# Patient Record
Sex: MALE | Race: WHITE | URBAN - METROPOLITAN AREA
[De-identification: names, ages, dates, MRNs, and addresses within clinical notes are randomized per-mention and may not be internally consistent; named-entity substitution may affect disease eponyms.]

---

## 2023-06-23 ENCOUNTER — OFFICE VISIT (OUTPATIENT)
Dept: PRIMARY CARE CLINIC | Age: 68
End: 2023-06-23
Payer: MEDICARE

## 2023-06-23 VITALS
HEART RATE: 67 BPM | SYSTOLIC BLOOD PRESSURE: 142 MMHG | BODY MASS INDEX: 34.29 KG/M2 | DIASTOLIC BLOOD PRESSURE: 82 MMHG | HEIGHT: 72 IN | WEIGHT: 253.2 LBS | OXYGEN SATURATION: 99 %

## 2023-06-23 DIAGNOSIS — Z11.59 SCREENING FOR VIRAL DISEASE: ICD-10-CM

## 2023-06-23 DIAGNOSIS — M10.9 GOUT, UNSPECIFIED CAUSE, UNSPECIFIED CHRONICITY, UNSPECIFIED SITE: ICD-10-CM

## 2023-06-23 DIAGNOSIS — G45.9 TIA (TRANSIENT ISCHEMIC ATTACK): Primary | ICD-10-CM

## 2023-06-23 DIAGNOSIS — E78.00 HYPERCHOLESTEROLEMIA: ICD-10-CM

## 2023-06-23 DIAGNOSIS — I10 MALIGNANT HYPERTENSION: ICD-10-CM

## 2023-06-23 DIAGNOSIS — Z12.5 PROSTATE CANCER SCREENING: ICD-10-CM

## 2023-06-23 PROCEDURE — 99204 OFFICE O/P NEW MOD 45 MIN: CPT | Performed by: INTERNAL MEDICINE

## 2023-06-23 PROCEDURE — 1123F ACP DISCUSS/DSCN MKR DOCD: CPT | Performed by: INTERNAL MEDICINE

## 2023-06-23 PROCEDURE — 3077F SYST BP >= 140 MM HG: CPT | Performed by: INTERNAL MEDICINE

## 2023-06-23 PROCEDURE — 3079F DIAST BP 80-89 MM HG: CPT | Performed by: INTERNAL MEDICINE

## 2023-06-23 RX ORDER — ALLOPURINOL 100 MG/1
100 TABLET ORAL DAILY
Qty: 90 TABLET | Refills: 1 | Status: SHIPPED | OUTPATIENT
Start: 2023-06-23

## 2023-06-23 RX ORDER — METOPROLOL TARTRATE 100 MG/1
100 TABLET ORAL 2 TIMES DAILY
Qty: 180 TABLET | Refills: 1 | Status: SHIPPED | OUTPATIENT
Start: 2023-06-23

## 2023-06-23 RX ORDER — CLONIDINE HYDROCHLORIDE 0.3 MG/1
0.3 TABLET ORAL EVERY 8 HOURS SCHEDULED
Qty: 270 TABLET | Refills: 0 | Status: SHIPPED | OUTPATIENT
Start: 2023-06-23

## 2023-06-23 RX ORDER — LISINOPRIL 10 MG/1
10 TABLET ORAL DAILY
Qty: 90 TABLET | Refills: 1 | Status: SHIPPED | OUTPATIENT
Start: 2023-06-23

## 2023-06-23 RX ORDER — PRAVASTATIN SODIUM 40 MG
40 TABLET ORAL EVERY EVENING
Qty: 90 TABLET | Refills: 1 | Status: SHIPPED | OUTPATIENT
Start: 2023-06-23

## 2023-06-23 SDOH — ECONOMIC STABILITY: FOOD INSECURITY: WITHIN THE PAST 12 MONTHS, THE FOOD YOU BOUGHT JUST DIDN'T LAST AND YOU DIDN'T HAVE MONEY TO GET MORE.: NEVER TRUE

## 2023-06-23 SDOH — ECONOMIC STABILITY: INCOME INSECURITY: HOW HARD IS IT FOR YOU TO PAY FOR THE VERY BASICS LIKE FOOD, HOUSING, MEDICAL CARE, AND HEATING?: NOT HARD AT ALL

## 2023-06-23 SDOH — ECONOMIC STABILITY: FOOD INSECURITY: WITHIN THE PAST 12 MONTHS, YOU WORRIED THAT YOUR FOOD WOULD RUN OUT BEFORE YOU GOT MONEY TO BUY MORE.: NEVER TRUE

## 2023-06-23 SDOH — ECONOMIC STABILITY: HOUSING INSECURITY
IN THE LAST 12 MONTHS, WAS THERE A TIME WHEN YOU DID NOT HAVE A STEADY PLACE TO SLEEP OR SLEPT IN A SHELTER (INCLUDING NOW)?: NO

## 2023-06-23 ASSESSMENT — PATIENT HEALTH QUESTIONNAIRE - PHQ9
SUM OF ALL RESPONSES TO PHQ QUESTIONS 1-9: 0
SUM OF ALL RESPONSES TO PHQ QUESTIONS 1-9: 0
SUM OF ALL RESPONSES TO PHQ9 QUESTIONS 1 & 2: 0
2. FEELING DOWN, DEPRESSED OR HOPELESS: 0
SUM OF ALL RESPONSES TO PHQ QUESTIONS 1-9: 0
SUM OF ALL RESPONSES TO PHQ QUESTIONS 1-9: 0
1. LITTLE INTEREST OR PLEASURE IN DOING THINGS: 0

## 2023-06-23 NOTE — PROGRESS NOTES
6/23/2023   Dolores Sullivan  1955    The patients PMH, surgical history, family history, medications, allergies were all reviewed and updated as appropriate today. Current Outpatient Medications on File Prior to Visit   Medication Sig Dispense Refill    ibuprofen (ADVIL;MOTRIN) 800 MG tablet Take 1 tablet by mouth every 8 hours as needed for Pain or Fever 20 tablet 0    lisinopril (PRINIVIL;ZESTRIL) 10 MG tablet Take 1 tablet by mouth daily      allopurinol (ZYLOPRIM) 100 MG tablet Take 1 tablet by mouth daily      cloNIDine (CATAPRES) 0.3 MG tablet Take 1 tablet by mouth every 8 hours. 60 tablet 0    pravastatin (PRAVACHOL) 40 MG tablet Take 1 tablet by mouth every evening. 30 tablet 0    metoprolol (LOPRESSOR) 100 MG tablet Take 1 tablet by mouth 2 times daily. 60 tablet 0    aspirin 81 MG EC tablet Take 1 tablet by mouth daily. 30 tablet 0     No current facility-administered medications on file prior to visit. Chief Complaint   Patient presents with    New Patient     Here to establish care. HPI:  New patient, wants to re-establish care again with this office, used to be pt here 10 years ago    Review of Systems    OBJECTIVE:  BP (!) 142/82 (Site: Right Upper Arm, Position: Sitting, Cuff Size: Large Adult)   Pulse 67   Ht 6' (1.829 m)   Wt 253 lb 3.2 oz (114.9 kg)   SpO2 99%   BMI 34.34 kg/m²    Wt Readings from Last 3 Encounters:   06/23/23 253 lb 3.2 oz (114.9 kg)       Physical Exam  Vitals and nursing note reviewed. Constitutional:       General: He is not in acute distress. Appearance: He is well-developed. Comments: BP (!) 142/82 (Site: Right Upper Arm, Position: Sitting, Cuff Size: Large Adult)   Pulse 67   Ht 6' (1.829 m)   Wt 253 lb 3.2 oz (114.9 kg)   SpO2 99%   BMI 34.34 kg/m²    HENT:      Head: Normocephalic and atraumatic. Eyes:      General: No scleral icterus. Right eye: No discharge. Left eye: No discharge.       Conjunctiva/sclera:

## 2023-07-31 DIAGNOSIS — Z12.5 PROSTATE CANCER SCREENING: ICD-10-CM

## 2023-07-31 DIAGNOSIS — Z11.59 SCREENING FOR VIRAL DISEASE: ICD-10-CM

## 2023-07-31 DIAGNOSIS — G45.9 TIA (TRANSIENT ISCHEMIC ATTACK): ICD-10-CM

## 2023-07-31 DIAGNOSIS — M10.9 GOUT, UNSPECIFIED CAUSE, UNSPECIFIED CHRONICITY, UNSPECIFIED SITE: ICD-10-CM

## 2023-07-31 DIAGNOSIS — I10 MALIGNANT HYPERTENSION: ICD-10-CM

## 2023-07-31 LAB
ALBUMIN SERPL-MCNC: 4.4 G/DL (ref 3.4–5)
ALBUMIN/GLOB SERPL: 2 {RATIO} (ref 1.1–2.2)
ALP SERPL-CCNC: 136 U/L (ref 40–129)
ALT SERPL-CCNC: 16 U/L (ref 10–40)
ANION GAP SERPL CALCULATED.3IONS-SCNC: 13 MMOL/L (ref 3–16)
AST SERPL-CCNC: 16 U/L (ref 15–37)
BASOPHILS # BLD: 0.1 K/UL (ref 0–0.2)
BASOPHILS NFR BLD: 0.8 %
BILIRUB SERPL-MCNC: 0.6 MG/DL (ref 0–1)
BUN SERPL-MCNC: 8 MG/DL (ref 7–20)
CALCIUM SERPL-MCNC: 9 MG/DL (ref 8.3–10.6)
CHLORIDE SERPL-SCNC: 108 MMOL/L (ref 99–110)
CHOLEST SERPL-MCNC: 153 MG/DL (ref 0–199)
CO2 SERPL-SCNC: 24 MMOL/L (ref 21–32)
CREAT SERPL-MCNC: 1.5 MG/DL (ref 0.8–1.3)
DEPRECATED RDW RBC AUTO: 13.3 % (ref 12.4–15.4)
EOSINOPHIL # BLD: 0.5 K/UL (ref 0–0.6)
EOSINOPHIL NFR BLD: 7 %
GFR SERPLBLD CREATININE-BSD FMLA CKD-EPI: 50 ML/MIN/{1.73_M2}
GLUCOSE P FAST SERPL-MCNC: 96 MG/DL (ref 70–99)
HCT VFR BLD AUTO: 46.2 % (ref 40.5–52.5)
HCV AB SERPL QL IA: NORMAL
HDLC SERPL-MCNC: 36 MG/DL (ref 40–60)
HGB BLD-MCNC: 15.5 G/DL (ref 13.5–17.5)
LDL CHOLESTEROL CALCULATED: 92 MG/DL
LYMPHOCYTES # BLD: 2.3 K/UL (ref 1–5.1)
LYMPHOCYTES NFR BLD: 34.4 %
MCH RBC QN AUTO: 31.1 PG (ref 26–34)
MCHC RBC AUTO-ENTMCNC: 33.5 G/DL (ref 31–36)
MCV RBC AUTO: 92.8 FL (ref 80–100)
MONOCYTES # BLD: 0.6 K/UL (ref 0–1.3)
MONOCYTES NFR BLD: 8.6 %
NEUTROPHILS # BLD: 3.3 K/UL (ref 1.7–7.7)
NEUTROPHILS NFR BLD: 49.2 %
PLATELET # BLD AUTO: 236 K/UL (ref 135–450)
PMV BLD AUTO: 10.1 FL (ref 5–10.5)
POTASSIUM SERPL-SCNC: 4.7 MMOL/L (ref 3.5–5.1)
PROT SERPL-MCNC: 6.6 G/DL (ref 6.4–8.2)
PSA SERPL DL<=0.01 NG/ML-MCNC: 1.56 NG/ML (ref 0–4)
RBC # BLD AUTO: 4.98 M/UL (ref 4.2–5.9)
SODIUM SERPL-SCNC: 145 MMOL/L (ref 136–145)
TRIGL SERPL-MCNC: 125 MG/DL (ref 0–150)
URATE SERPL-MCNC: 8 MG/DL (ref 3.5–7.2)
VLDLC SERPL CALC-MCNC: 25 MG/DL
WBC # BLD AUTO: 6.7 K/UL (ref 4–11)

## 2023-08-01 ENCOUNTER — TELEPHONE (OUTPATIENT)
Dept: PRIMARY CARE CLINIC | Age: 68
End: 2023-08-01

## 2023-08-01 LAB
HIV 1+2 AB+HIV1 P24 AG SERPL QL IA: NORMAL
HIV 2 AB SERPL QL IA: NORMAL
HIV1 AB SERPL QL IA: NORMAL
HIV1 P24 AG SERPL QL IA: NORMAL

## 2023-08-01 NOTE — TELEPHONE ENCOUNTER
BP is still high but he can stay on current meds until next visit and we'll discuss BP meds then    Susanne Vidal MD

## 2023-08-01 NOTE — RESULT ENCOUNTER NOTE
Uric acid level remain a little high but is improved from last time  Drink plenty of liquids to avoid dehydration and OK to decrease meat intake a little to try to get uric acid levels back to normal range    Irma Atkins MD

## 2023-08-01 NOTE — TELEPHONE ENCOUNTER
Spoke with patient he states he has had some side effects ringing in the ears, muscle fatigue and diarrhea to the medications Dr Krystyna Arias has prescribed states he stop them went back to taking just Allopurinol and Metoprolol. States BP up and down last bp was 161/90 this morning.  Please advise

## 2023-08-04 ENCOUNTER — OFFICE VISIT (OUTPATIENT)
Dept: PRIMARY CARE CLINIC | Age: 68
End: 2023-08-04

## 2023-08-04 VITALS
SYSTOLIC BLOOD PRESSURE: 164 MMHG | DIASTOLIC BLOOD PRESSURE: 98 MMHG | HEART RATE: 72 BPM | WEIGHT: 252 LBS | HEIGHT: 72 IN | RESPIRATION RATE: 20 BRPM | BODY MASS INDEX: 34.13 KG/M2 | OXYGEN SATURATION: 96 %

## 2023-08-04 DIAGNOSIS — E78.00 HYPERCHOLESTEROLEMIA: ICD-10-CM

## 2023-08-04 DIAGNOSIS — I10 MALIGNANT HYPERTENSION: ICD-10-CM

## 2023-08-04 DIAGNOSIS — G45.9 TIA (TRANSIENT ISCHEMIC ATTACK): Primary | ICD-10-CM

## 2023-08-04 RX ORDER — LISINOPRIL 10 MG/1
20 TABLET ORAL DAILY
Qty: 90 TABLET | Refills: 1
Start: 2023-08-04

## 2023-08-17 ENCOUNTER — OFFICE VISIT (OUTPATIENT)
Dept: PRIMARY CARE CLINIC | Age: 68
End: 2023-08-17

## 2023-08-17 VITALS
BODY MASS INDEX: 33.7 KG/M2 | HEART RATE: 61 BPM | WEIGHT: 248.8 LBS | OXYGEN SATURATION: 98 % | HEIGHT: 72 IN | DIASTOLIC BLOOD PRESSURE: 90 MMHG | SYSTOLIC BLOOD PRESSURE: 140 MMHG

## 2023-08-17 DIAGNOSIS — Z23 NEED FOR PROPHYLACTIC VACCINATION AND INOCULATION AGAINST VARICELLA: ICD-10-CM

## 2023-08-17 DIAGNOSIS — E79.0 HYPERURICEMIA: ICD-10-CM

## 2023-08-17 DIAGNOSIS — Z00.00 INITIAL MEDICARE ANNUAL WELLNESS VISIT: ICD-10-CM

## 2023-08-17 DIAGNOSIS — E78.00 HYPERCHOLESTEROLEMIA: ICD-10-CM

## 2023-08-17 DIAGNOSIS — Z12.11 COLON CANCER SCREENING: ICD-10-CM

## 2023-08-17 DIAGNOSIS — Z23 NEED FOR PROPHYLACTIC VACCINATION AGAINST DIPHTHERIA-TETANUS-PERTUSSIS (DTP): ICD-10-CM

## 2023-08-17 DIAGNOSIS — I10 MALIGNANT HYPERTENSION: Primary | ICD-10-CM

## 2023-08-17 RX ORDER — ALLOPURINOL 100 MG/1
100 TABLET ORAL DAILY
Qty: 90 TABLET | Refills: 1 | Status: SHIPPED | OUTPATIENT
Start: 2023-08-17

## 2023-08-17 ASSESSMENT — PATIENT HEALTH QUESTIONNAIRE - PHQ9
2. FEELING DOWN, DEPRESSED OR HOPELESS: 0
SUM OF ALL RESPONSES TO PHQ QUESTIONS 1-9: 0
SUM OF ALL RESPONSES TO PHQ9 QUESTIONS 1 & 2: 0
SUM OF ALL RESPONSES TO PHQ QUESTIONS 1-9: 0
1. LITTLE INTEREST OR PLEASURE IN DOING THINGS: 0

## 2023-08-17 NOTE — PATIENT INSTRUCTIONS
disclaims any warranty or liability for your use of this information. Personalized Preventive Plan for Hair Brannon - 8/17/2023  Medicare offers a range of preventive health benefits. Some of the tests and screenings are paid in full while other may be subject to a deductible, co-insurance, and/or copay. Some of these benefits include a comprehensive review of your medical history including lifestyle, illnesses that may run in your family, and various assessments and screenings as appropriate. After reviewing your medical record and screening and assessments performed today your provider may have ordered immunizations, labs, imaging, and/or referrals for you. A list of these orders (if applicable) as well as your Preventive Care list are included within your After Visit Summary for your review. Other Preventive Recommendations:    A preventive eye exam performed by an eye specialist is recommended every 1-2 years to screen for glaucoma; cataracts, macular degeneration, and other eye disorders. A preventive dental visit is recommended every 6 months. Try to get at least 150 minutes of exercise per week or 10,000 steps per day on a pedometer . Order or download the FREE \"Exercise & Physical Activity: Your Everyday Guide\" from [employer]. Call 0-468.171.2097 or search [employer] online. You need 1592-5943 mg of calcium and 1200-2211 IU of vitamin D per day. It is possible to meet your calcium requirement with diet alone, but a vitamin D supplement is usually necessary to meet this goal.  When exposed to the sun, use a sunscreen that protects against both UVA and UVB radiation with an SPF of 30 or greater. Reapply every 2 to 3 hours or after sweating, drying off with a towel, or swimming. Always wear a seat belt when traveling in a car. Always wear a helmet when riding a bicycle or motorcycle.

## 2023-12-26 NOTE — TELEPHONE ENCOUNTER
LastVisit 8/17/2023     NextVisit 8/19/2024     Pharmacy:    [id number] [address] [place] [address], [address] [phone]  [address] [place] [address]  Phone: [phone] Fax: [phone]     pharmacy confirmed in [facility]

## 2023-12-27 RX ORDER — METOPROLOL TARTRATE 100 MG/1
100 TABLET ORAL 2 TIMES DAILY
Qty: 180 TABLET | Refills: 1 | Status: SHIPPED | OUTPATIENT
Start: 2023-12-27

## 2023-12-27 RX ORDER — LISINOPRIL 10 MG/1
10 TABLET ORAL DAILY
Qty: 90 TABLET | Refills: 1 | Status: SHIPPED | OUTPATIENT
Start: 2023-12-27

## 2023-12-28 ENCOUNTER — OFFICE VISIT (OUTPATIENT)
Dept: PRIMARY CARE CLINIC | Age: 68
End: 2023-12-28
Payer: MEDICARE

## 2023-12-28 VITALS
SYSTOLIC BLOOD PRESSURE: 144 MMHG | HEIGHT: 72 IN | BODY MASS INDEX: 32.64 KG/M2 | TEMPERATURE: 97.8 F | OXYGEN SATURATION: 96 % | DIASTOLIC BLOOD PRESSURE: 92 MMHG | RESPIRATION RATE: 20 BRPM | WEIGHT: 241 LBS | HEART RATE: 67 BPM

## 2023-12-28 DIAGNOSIS — M70.21 OLECRANON BURSITIS OF RIGHT ELBOW: ICD-10-CM

## 2023-12-28 DIAGNOSIS — M10.9 ACUTE GOUT OF RIGHT HAND, UNSPECIFIED CAUSE: Primary | ICD-10-CM

## 2023-12-28 DIAGNOSIS — I10 ESSENTIAL HYPERTENSION, BENIGN: ICD-10-CM

## 2023-12-28 PROCEDURE — 99214 OFFICE O/P EST MOD 30 MIN: CPT | Performed by: FAMILY MEDICINE

## 2023-12-28 PROCEDURE — 3080F DIAST BP >= 90 MM HG: CPT | Performed by: FAMILY MEDICINE

## 2023-12-28 PROCEDURE — 1123F ACP DISCUSS/DSCN MKR DOCD: CPT | Performed by: FAMILY MEDICINE

## 2023-12-28 PROCEDURE — 3077F SYST BP >= 140 MM HG: CPT | Performed by: FAMILY MEDICINE

## 2023-12-28 RX ORDER — INDOMETHACIN 25 MG/1
50 CAPSULE ORAL 2 TIMES DAILY WITH MEALS
COMMUNITY

## 2023-12-28 RX ORDER — PREDNISONE 20 MG/1
40 TABLET ORAL DAILY
Qty: 10 TABLET | Refills: 0 | Status: SHIPPED | OUTPATIENT
Start: 2023-12-28 | End: 2024-01-02

## 2023-12-28 RX ORDER — COLCHICINE 0.6 MG/1
0.6 TABLET ORAL 2 TIMES DAILY
Qty: 10 TABLET | Refills: 0 | Status: SHIPPED | OUTPATIENT
Start: 2023-12-28 | End: 2024-01-02

## 2024-01-02 RX ORDER — PRAVASTATIN SODIUM 40 MG
40 TABLET ORAL EVERY EVENING
Qty: 90 TABLET | Refills: 1 | Status: SHIPPED | OUTPATIENT
Start: 2024-01-02

## 2024-01-02 NOTE — TELEPHONE ENCOUNTER
LastVisit 8/17/2023     NextVisit 1/5/2024     Pharmacy:    [id number] - [place], [place] - [address] [facility] [address] [facility] - P [phone] - F [phone] [address] [facility] [address]  Phone: [phone] Fax: [phone]     pharmacy confirmed in EPIC

## 2024-01-22 ENCOUNTER — COMMUNITY OUTREACH (OUTPATIENT)
Dept: PRIMARY CARE CLINIC | Age: 69
End: 2024-01-22

## 2024-03-19 RX ORDER — INDOMETHACIN 25 MG/1
50 CAPSULE ORAL 2 TIMES DAILY WITH MEALS
Qty: 60 CAPSULE | Refills: 1 | Status: SHIPPED | OUTPATIENT
Start: 2024-03-19

## 2024-03-19 RX ORDER — LISINOPRIL 10 MG/1
10 TABLET ORAL DAILY
Qty: 90 TABLET | Refills: 1 | Status: SHIPPED | OUTPATIENT
Start: 2024-03-19

## 2024-03-19 NOTE — TELEPHONE ENCOUNTER
Patient requested refill    indomethacin (INDOCIN) 25 MG capsule   lisinopril (PRINIVIL;ZESTRIL) 10 MG tablet   [id number] - [place], [place] - [facility] -  [phone] -

## 2024-03-26 ENCOUNTER — TELEPHONE (OUTPATIENT)
Dept: ADMINISTRATIVE | Age: 69
End: 2024-03-26

## 2024-03-26 NOTE — TELEPHONE ENCOUNTER
Submitted PA for Indomethacin 25MG capsules, via FAX to [facility], Case: [id number]. STATUS: PENDING     Follow up done daily; if no decision with in three days we will refax.  If another three days goes by with no decision will call the insurance for status.

## 2024-04-01 NOTE — TELEPHONE ENCOUNTER
Called [facility] for a status check.  Spoke to Marcell SHORT who said that the PA for Indomethacin was APPROVED through 12/31/2024.    If this requires a response please respond to the pool ( P MHCX PSC MEDICATION PRE-AUTH).      Thank you please advise patient.

## 2024-05-20 RX ORDER — INDOMETHACIN 25 MG/1
50 CAPSULE ORAL 2 TIMES DAILY WITH MEALS
Qty: 60 CAPSULE | Refills: 1 | Status: SHIPPED | OUTPATIENT
Start: 2024-05-20

## 2024-05-20 NOTE — TELEPHONE ENCOUNTER
Recent Visits  Date Type Provider Dept   12/28/23 Office Visit Reyes, Eleia J, MD [facility]   08/17/23 Office Visit Pino Lundberg MD [facility]   08/04/23 Office Visit Pino Lundberg MD [facility]   06/23/23 Office Visit Pino Lundberg MD [facility]   Showing recent visits within past 540 days with a meds authorizing provider and meeting all other requirements  Future Appointments  Date Type Provider Dept   08/19/24 Appointment Pino Lundberg MD [facility]   Showing future appointments within next 150 days with a meds authorizing provider and meeting all other requirements

## 2024-06-13 DIAGNOSIS — I10 ESSENTIAL (PRIMARY) HYPERTENSION: ICD-10-CM

## 2024-06-14 RX ORDER — AMLODIPINE BESYLATE 10 MG/1
10 TABLET ORAL DAILY
Qty: 90 TABLET | Refills: 1 | Status: SHIPPED | OUTPATIENT
Start: 2024-06-14

## 2024-06-14 NOTE — TELEPHONE ENCOUNTER
LastVisit 8/17/2023   LastLabs 08/17/2023   NextVisit 9/4/2024   LastRefilled unknown  Pharmacy:    [id number] [place], [place] - [address] [facility] - P [phone] - F [phone] [address] [facility] [address]  Phone: [phone] Fax: [phone]     pharmacy confirmed in EPIC

## 2024-06-17 RX ORDER — INDOMETHACIN 25 MG/1
50 CAPSULE ORAL 2 TIMES DAILY WITH MEALS
Qty: 60 CAPSULE | Refills: 1 | Status: SHIPPED | OUTPATIENT
Start: 2024-06-17

## 2024-06-17 NOTE — TELEPHONE ENCOUNTER
Recent Visits  Date Type Provider Dept   12/28/23 Office Visit Reyes, Eleia J, MD [facility]   08/17/23 Office Visit Pino Lundberg MD [facility]   08/04/23 Office Visit Pino Lundberg MD [facility]   06/23/23 Office Visit Pino Lundberg MD [facility]   Showing recent visits within past 540 days with a meds authorizing provider and meeting all other requirements  Future Appointments  Date Type Provider Dept   09/04/24 Appointment Pino Lundberg MD [facility]   Showing future appointments within next 150 days with a meds authorizing provider and meeting all other requirements

## 2024-06-20 RX ORDER — METOPROLOL TARTRATE 100 MG/1
100 TABLET ORAL 2 TIMES DAILY
Qty: 180 TABLET | Refills: 1 | Status: SHIPPED | OUTPATIENT
Start: 2024-06-20

## 2024-06-20 NOTE — TELEPHONE ENCOUNTER
Recent Visits  Date Type Provider Dept   12/28/23 Office Visit Reyes, Eleia J, MD [facility]   08/17/23 Office Visit Pino Lundberg MD [facility]   08/04/23 Office Visit Pino Lundberg MD [facility]   06/23/23 Office Visit Pino Lundberg MD [facility]   Showing recent visits within past 540 days with a meds authorizing provider and meeting all other requirements  Future Appointments  Date Type Provider Dept   09/04/24 Appointment Pino Lundberg MD [facility]   Showing future appointments within next 150 days with a meds authorizing provider and meeting all other requirements

## 2024-09-05 RX ORDER — INDOMETHACIN 25 MG/1
50 CAPSULE ORAL 2 TIMES DAILY WITH MEALS
Qty: 60 CAPSULE | Refills: 1 | OUTPATIENT
Start: 2024-09-05

## 2024-09-06 RX ORDER — LISINOPRIL 10 MG/1
10 TABLET ORAL DAILY
Qty: 90 TABLET | Refills: 1 | Status: SHIPPED | OUTPATIENT
Start: 2024-09-06

## 2024-09-06 NOTE — TELEPHONE ENCOUNTER
Recent Visits  Date Type Provider Dept   12/28/23 Office Visit Reyes, Eleia J, MD [facility]   08/17/23 Office Visit Pino Lundberg MD [facility]   08/04/23 Office Visit Pino Lundberg MD [facility]   06/23/23 Office Visit Pino Lundberg MD [facility]   Showing recent visits within past 540 days with a meds authorizing provider and meeting all other requirements  Future Appointments  No visits were found meeting these conditions.  Showing future appointments within next 150 days with a meds authorizing provider and meeting all other requirements

## 2024-09-23 ENCOUNTER — OFFICE VISIT (OUTPATIENT)
Dept: PRIMARY CARE CLINIC | Age: 69
End: 2024-09-23
Payer: MEDICARE

## 2024-09-23 VITALS
HEART RATE: 63 BPM | SYSTOLIC BLOOD PRESSURE: 130 MMHG | DIASTOLIC BLOOD PRESSURE: 80 MMHG | BODY MASS INDEX: 32.14 KG/M2 | WEIGHT: 237 LBS | OXYGEN SATURATION: 97 %

## 2024-09-23 DIAGNOSIS — Z12.5 PROSTATE CANCER SCREENING: Primary | ICD-10-CM

## 2024-09-23 DIAGNOSIS — Z12.11 COLON CANCER SCREENING: ICD-10-CM

## 2024-09-23 DIAGNOSIS — Z23 NEED FOR PNEUMOCOCCAL 20-VALENT CONJUGATE VACCINATION: ICD-10-CM

## 2024-09-23 DIAGNOSIS — I10 MALIGNANT HYPERTENSION: ICD-10-CM

## 2024-09-23 DIAGNOSIS — E78.00 HYPERCHOLESTEROLEMIA: ICD-10-CM

## 2024-09-23 DIAGNOSIS — G45.9 TIA (TRANSIENT ISCHEMIC ATTACK): ICD-10-CM

## 2024-09-23 PROCEDURE — 3079F DIAST BP 80-89 MM HG: CPT | Performed by: INTERNAL MEDICINE

## 2024-09-23 PROCEDURE — 1123F ACP DISCUSS/DSCN MKR DOCD: CPT | Performed by: INTERNAL MEDICINE

## 2024-09-23 PROCEDURE — 3075F SYST BP GE 130 - 139MM HG: CPT | Performed by: INTERNAL MEDICINE

## 2024-09-23 PROCEDURE — 99214 OFFICE O/P EST MOD 30 MIN: CPT | Performed by: INTERNAL MEDICINE

## 2024-09-23 RX ORDER — PRAVASTATIN SODIUM 40 MG
40 TABLET ORAL EVERY EVENING
Qty: 90 TABLET | Refills: 1 | Status: SHIPPED | OUTPATIENT
Start: 2024-09-23

## 2024-09-23 SDOH — ECONOMIC STABILITY: FOOD INSECURITY: WITHIN THE PAST 12 MONTHS, THE FOOD YOU BOUGHT JUST DIDN'T LAST AND YOU DIDN'T HAVE MONEY TO GET MORE.: NEVER TRUE

## 2024-09-23 SDOH — ECONOMIC STABILITY: FOOD INSECURITY: WITHIN THE PAST 12 MONTHS, YOU WORRIED THAT YOUR FOOD WOULD RUN OUT BEFORE YOU GOT MONEY TO BUY MORE.: NEVER TRUE

## 2024-09-23 SDOH — ECONOMIC STABILITY: INCOME INSECURITY: HOW HARD IS IT FOR YOU TO PAY FOR THE VERY BASICS LIKE FOOD, HOUSING, MEDICAL CARE, AND HEATING?: NOT HARD AT ALL

## 2024-09-23 ASSESSMENT — PATIENT HEALTH QUESTIONNAIRE - PHQ9
2. FEELING DOWN, DEPRESSED OR HOPELESS: NOT AT ALL
SUM OF ALL RESPONSES TO PHQ9 QUESTIONS 1 & 2: 0
SUM OF ALL RESPONSES TO PHQ QUESTIONS 1-9: 0
1. LITTLE INTEREST OR PLEASURE IN DOING THINGS: NOT AT ALL
SUM OF ALL RESPONSES TO PHQ QUESTIONS 1-9: 0

## 2024-12-08 DIAGNOSIS — I10 ESSENTIAL (PRIMARY) HYPERTENSION: ICD-10-CM

## 2024-12-09 RX ORDER — AMLODIPINE BESYLATE 10 MG/1
10 TABLET ORAL DAILY
Qty: 90 TABLET | Refills: 1 | Status: SHIPPED | OUTPATIENT
Start: 2024-12-09

## 2024-12-10 RX ORDER — METOPROLOL TARTRATE 100 MG/1
100 TABLET ORAL 2 TIMES DAILY
Qty: 180 TABLET | Refills: 1 | Status: SHIPPED | OUTPATIENT
Start: 2024-12-10

## 2025-01-23 NOTE — TELEPHONE ENCOUNTER
Phleb at bedside to draw cultures.   RT at bedside to assess vent alarms and suction pt.   Prior a

## 2025-02-26 RX ORDER — LISINOPRIL 10 MG/1
10 TABLET ORAL DAILY
Qty: 90 TABLET | Refills: 1 | Status: SHIPPED | OUTPATIENT
Start: 2025-02-26

## 2025-03-16 ENCOUNTER — APPOINTMENT (OUTPATIENT)
Dept: GENERAL RADIOLOGY | Age: 70
End: 2025-03-16
Payer: MEDICARE

## 2025-03-16 ENCOUNTER — HOSPITAL ENCOUNTER (INPATIENT)
Age: 70
LOS: 4 days | Discharge: HOME | End: 2025-03-21
Attending: STUDENT IN AN ORGANIZED HEALTH CARE EDUCATION/TRAINING PROGRAM | Admitting: INTERNAL MEDICINE
Payer: MEDICARE

## 2025-03-16 DIAGNOSIS — R06.00 DYSPNEA, UNSPECIFIED TYPE: ICD-10-CM

## 2025-03-16 DIAGNOSIS — R65.20 SEVERE SEPSIS: ICD-10-CM

## 2025-03-16 DIAGNOSIS — A41.9 SEVERE SEPSIS: ICD-10-CM

## 2025-03-16 DIAGNOSIS — J18.9 PNEUMONIA DUE TO INFECTIOUS ORGANISM, UNSPECIFIED LATERALITY, UNSPECIFIED PART OF LUNG: Primary | ICD-10-CM

## 2025-03-16 LAB
ALBUMIN SERPL-MCNC: 3.8 G/DL (ref 3.4–5)
ALBUMIN/GLOB SERPL: 1.1 {RATIO} (ref 1.1–2.2)
ALP SERPL-CCNC: 128 U/L (ref 40–129)
ALT SERPL-CCNC: 38 U/L (ref 10–40)
ANION GAP SERPL CALCULATED.3IONS-SCNC: 16 MMOL/L (ref 3–16)
AST SERPL-CCNC: 33 U/L (ref 15–37)
BASOPHILS # BLD: 0 K/UL (ref 0–0.2)
BASOPHILS NFR BLD: 0.1 %
BILIRUB SERPL-MCNC: 1.2 MG/DL (ref 0–1)
BUN SERPL-MCNC: 27 MG/DL (ref 7–20)
CALCIUM SERPL-MCNC: 9.4 MG/DL (ref 8.3–10.6)
CHLORIDE SERPL-SCNC: 99 MMOL/L (ref 99–110)
CO2 SERPL-SCNC: 21 MMOL/L (ref 21–32)
CREAT SERPL-MCNC: 1.5 MG/DL (ref 0.8–1.3)
DEPRECATED RDW RBC AUTO: 13.1 % (ref 12.4–15.4)
EOSINOPHIL # BLD: 0 K/UL (ref 0–0.6)
EOSINOPHIL NFR BLD: 0.1 %
GFR SERPLBLD CREATININE-BSD FMLA CKD-EPI: 49 ML/MIN/{1.73_M2}
GLUCOSE SERPL-MCNC: 154 MG/DL (ref 70–99)
HCT VFR BLD AUTO: 44.3 % (ref 40.5–52.5)
HGB BLD-MCNC: 15.2 G/DL (ref 13.5–17.5)
LYMPHOCYTES # BLD: 1.9 K/UL (ref 1–5.1)
LYMPHOCYTES NFR BLD: 8.6 %
MAGNESIUM SERPL-MCNC: 1.79 MG/DL (ref 1.8–2.4)
MCH RBC QN AUTO: 31.2 PG (ref 26–34)
MCHC RBC AUTO-ENTMCNC: 34.2 G/DL (ref 31–36)
MCV RBC AUTO: 91.1 FL (ref 80–100)
MONOCYTES # BLD: 1.7 K/UL (ref 0–1.3)
MONOCYTES NFR BLD: 7.5 %
NEUTROPHILS # BLD: 18.6 K/UL (ref 1.7–7.7)
NEUTROPHILS NFR BLD: 83.7 %
PLATELET # BLD AUTO: 279 K/UL (ref 135–450)
PMV BLD AUTO: 8.9 FL (ref 5–10.5)
POTASSIUM SERPL-SCNC: 3.4 MMOL/L (ref 3.5–5.1)
PROT SERPL-MCNC: 7.3 G/DL (ref 6.4–8.2)
RBC # BLD AUTO: 4.87 M/UL (ref 4.2–5.9)
SODIUM SERPL-SCNC: 136 MMOL/L (ref 136–145)
TROPONIN, HIGH SENSITIVITY: 33 NG/L (ref 0–22)
WBC # BLD AUTO: 22.2 K/UL (ref 4–11)

## 2025-03-16 PROCEDURE — 82803 BLOOD GASES ANY COMBINATION: CPT

## 2025-03-16 PROCEDURE — 71045 X-RAY EXAM CHEST 1 VIEW: CPT

## 2025-03-16 PROCEDURE — 84484 ASSAY OF TROPONIN QUANT: CPT

## 2025-03-16 PROCEDURE — 85379 FIBRIN DEGRADATION QUANT: CPT

## 2025-03-16 PROCEDURE — 99285 EMERGENCY DEPT VISIT HI MDM: CPT

## 2025-03-16 PROCEDURE — 87636 SARSCOV2 & INF A&B AMP PRB: CPT

## 2025-03-16 PROCEDURE — 85025 COMPLETE CBC W/AUTO DIFF WBC: CPT

## 2025-03-16 PROCEDURE — 36415 COLL VENOUS BLD VENIPUNCTURE: CPT

## 2025-03-16 PROCEDURE — 83735 ASSAY OF MAGNESIUM: CPT

## 2025-03-16 PROCEDURE — 83880 ASSAY OF NATRIURETIC PEPTIDE: CPT

## 2025-03-16 PROCEDURE — 83605 ASSAY OF LACTIC ACID: CPT

## 2025-03-16 PROCEDURE — 80053 COMPREHEN METABOLIC PANEL: CPT

## 2025-03-16 PROCEDURE — 93005 ELECTROCARDIOGRAM TRACING: CPT | Performed by: STUDENT IN AN ORGANIZED HEALTH CARE EDUCATION/TRAINING PROGRAM

## 2025-03-16 RX ORDER — AZITHROMYCIN MONOHYDRATE 500 MG/5ML
500 INJECTION, POWDER, LYOPHILIZED, FOR SOLUTION INTRAVENOUS ONCE
Status: DISCONTINUED | OUTPATIENT
Start: 2025-03-17 | End: 2025-03-17

## 2025-03-16 RX ORDER — ALBUTEROL SULFATE 0.83 MG/ML
5 SOLUTION RESPIRATORY (INHALATION) ONCE
Status: COMPLETED | OUTPATIENT
Start: 2025-03-17 | End: 2025-03-17

## 2025-03-16 ASSESSMENT — PAIN - FUNCTIONAL ASSESSMENT: PAIN_FUNCTIONAL_ASSESSMENT: 0-10

## 2025-03-16 ASSESSMENT — LIFESTYLE VARIABLES
HOW MANY STANDARD DRINKS CONTAINING ALCOHOL DO YOU HAVE ON A TYPICAL DAY: PATIENT DOES NOT DRINK
HOW OFTEN DO YOU HAVE A DRINK CONTAINING ALCOHOL: NEVER

## 2025-03-16 ASSESSMENT — PAIN DESCRIPTION - LOCATION: LOCATION: BACK

## 2025-03-16 ASSESSMENT — PAIN DESCRIPTION - PAIN TYPE: TYPE: ACUTE PAIN

## 2025-03-16 ASSESSMENT — PAIN SCALES - GENERAL: PAINLEVEL_OUTOF10: 9

## 2025-03-17 ENCOUNTER — APPOINTMENT (OUTPATIENT)
Dept: CT IMAGING | Age: 70
End: 2025-03-17
Payer: MEDICARE

## 2025-03-17 PROBLEM — J15.9 COMMUNITY ACQUIRED BACTERIAL PNEUMONIA: Status: ACTIVE | Noted: 2025-03-17

## 2025-03-17 LAB
ANION GAP SERPL CALCULATED.3IONS-SCNC: 16 MMOL/L (ref 3–16)
BACTERIA URNS QL MICRO: NORMAL /HPF
BASE EXCESS BLDV CALC-SCNC: -2.9 MMOL/L (ref -3–3)
BILIRUB UR QL STRIP.AUTO: NEGATIVE
BUN SERPL-MCNC: 24 MG/DL (ref 7–20)
CALCIUM SERPL-MCNC: 8.6 MG/DL (ref 8.3–10.6)
CHLORIDE SERPL-SCNC: 100 MMOL/L (ref 99–110)
CLARITY UR: CLEAR
CO2 BLDV-SCNC: 54 MMOL/L
CO2 SERPL-SCNC: 18 MMOL/L (ref 21–32)
COHGB MFR BLDV: 2.4 % (ref 0–1.5)
COLOR UR: YELLOW
CREAT SERPL-MCNC: 1.5 MG/DL (ref 0.8–1.3)
D-DIMER QUANTITATIVE: 1.05 UG/ML FEU (ref 0–0.6)
DEPRECATED RDW RBC AUTO: 13.1 % (ref 12.4–15.4)
DO-HGB MFR BLDV: 19 %
EKG ATRIAL RATE: 108 BPM
EKG DIAGNOSIS: NORMAL
EKG P AXIS: 93 DEGREES
EKG P-R INTERVAL: 144 MS
EKG Q-T INTERVAL: 324 MS
EKG QRS DURATION: 74 MS
EKG QTC CALCULATION (BAZETT): 434 MS
EKG R AXIS: 28 DEGREES
EKG T AXIS: 100 DEGREES
EKG VENTRICULAR RATE: 108 BPM
EPI CELLS #/AREA URNS AUTO: 2 /HPF (ref 0–5)
FLUAV RNA RESP QL NAA+PROBE: DETECTED
FLUBV RNA RESP QL NAA+PROBE: NOT DETECTED
GFR SERPLBLD CREATININE-BSD FMLA CKD-EPI: 49 ML/MIN/{1.73_M2}
GLUCOSE BLD-MCNC: 123 MG/DL (ref 70–99)
GLUCOSE BLD-MCNC: 126 MG/DL (ref 70–99)
GLUCOSE BLD-MCNC: 127 MG/DL (ref 70–99)
GLUCOSE SERPL-MCNC: 229 MG/DL (ref 70–99)
GLUCOSE UR STRIP.AUTO-MCNC: NEGATIVE MG/DL
HCO3 BLDV-SCNC: 22.6 MMOL/L (ref 23–29)
HCT VFR BLD AUTO: 39.7 % (ref 40.5–52.5)
HGB BLD-MCNC: 13.6 G/DL (ref 13.5–17.5)
HGB UR QL STRIP.AUTO: ABNORMAL
HYALINE CASTS #/AREA URNS AUTO: 2 /LPF (ref 0–8)
KETONES UR STRIP.AUTO-MCNC: NEGATIVE MG/DL
LACTATE BLDV-SCNC: 1.4 MMOL/L (ref 0.4–1.9)
LACTATE BLDV-SCNC: 1.7 MMOL/L (ref 0.4–1.9)
LACTATE BLDV-SCNC: 2.4 MMOL/L (ref 0.4–1.9)
LACTATE BLDV-SCNC: 2.4 MMOL/L (ref 0.4–2)
LACTATE BLDV-SCNC: 4.8 MMOL/L (ref 0.4–1.9)
LEUKOCYTE ESTERASE UR QL STRIP.AUTO: NEGATIVE
MAGNESIUM SERPL-MCNC: 1.82 MG/DL (ref 1.8–2.4)
MCH RBC QN AUTO: 31.1 PG (ref 26–34)
MCHC RBC AUTO-ENTMCNC: 34.3 G/DL (ref 31–36)
MCV RBC AUTO: 90.7 FL (ref 80–100)
METHGB MFR BLDV: 0.3 %
NITRITE UR QL STRIP.AUTO: NEGATIVE
NT-PROBNP SERPL-MCNC: 584 PG/ML (ref 0–124)
O2 CT VFR BLDV CALC: 17 VOL %
O2 THERAPY: ABNORMAL
ORGANISM: ABNORMAL
PCO2 BLDV: 41.3 MMHG (ref 40–50)
PERFORMED ON: ABNORMAL
PH BLDV: 7.35 [PH] (ref 7.35–7.45)
PH UR STRIP.AUTO: 5 [PH] (ref 5–8)
PHOSPHATE SERPL-MCNC: 2.1 MG/DL (ref 2.5–4.9)
PLATELET # BLD AUTO: 259 K/UL (ref 135–450)
PMV BLD AUTO: 9.2 FL (ref 5–10.5)
PO2 BLDV: 46.5 MMHG (ref 25–40)
POTASSIUM SERPL-SCNC: 3.5 MMOL/L (ref 3.5–5.1)
PROT UR STRIP.AUTO-MCNC: 100 MG/DL
RBC # BLD AUTO: 4.37 M/UL (ref 4.2–5.9)
RBC CLUMPS #/AREA URNS AUTO: 1 /HPF (ref 0–4)
REPORT: NORMAL
RESP PATH DNA+RNA PNL NPH NAA+NON-PROBE: ABNORMAL
SAO2 % BLDV: 80 %
SARS-COV-2 RNA RESP QL NAA+PROBE: NOT DETECTED
SODIUM SERPL-SCNC: 134 MMOL/L (ref 136–145)
SP GR UR STRIP.AUTO: >=1.03 (ref 1–1.03)
TROPONIN, HIGH SENSITIVITY: 32 NG/L (ref 0–22)
UA DIPSTICK W REFLEX MICRO PNL UR: YES
URN SPEC COLLECT METH UR: ABNORMAL
UROBILINOGEN UR STRIP-ACNC: 1 E.U./DL
WBC # BLD AUTO: 21.1 K/UL (ref 4–11)
WBC #/AREA URNS AUTO: 2 /HPF (ref 0–5)

## 2025-03-17 PROCEDURE — 87641 MR-STAPH DNA AMP PROBE: CPT

## 2025-03-17 PROCEDURE — 0202U NFCT DS 22 TRGT SARS-COV-2: CPT

## 2025-03-17 PROCEDURE — 1200000000 HC SEMI PRIVATE

## 2025-03-17 PROCEDURE — 6360000002 HC RX W HCPCS: Performed by: STUDENT IN AN ORGANIZED HEALTH CARE EDUCATION/TRAINING PROGRAM

## 2025-03-17 PROCEDURE — 80048 BASIC METABOLIC PNL TOTAL CA: CPT

## 2025-03-17 PROCEDURE — 87040 BLOOD CULTURE FOR BACTERIA: CPT

## 2025-03-17 PROCEDURE — 6370000000 HC RX 637 (ALT 250 FOR IP): Performed by: STUDENT IN AN ORGANIZED HEALTH CARE EDUCATION/TRAINING PROGRAM

## 2025-03-17 PROCEDURE — 6360000002 HC RX W HCPCS: Performed by: NURSE PRACTITIONER

## 2025-03-17 PROCEDURE — 96374 THER/PROPH/DIAG INJ IV PUSH: CPT

## 2025-03-17 PROCEDURE — 84484 ASSAY OF TROPONIN QUANT: CPT

## 2025-03-17 PROCEDURE — 2700000000 HC OXYGEN THERAPY PER DAY

## 2025-03-17 PROCEDURE — 94761 N-INVAS EAR/PLS OXIMETRY MLT: CPT

## 2025-03-17 PROCEDURE — 2500000003 HC RX 250 WO HCPCS: Performed by: STUDENT IN AN ORGANIZED HEALTH CARE EDUCATION/TRAINING PROGRAM

## 2025-03-17 PROCEDURE — 93010 ELECTROCARDIOGRAM REPORT: CPT | Performed by: INTERNAL MEDICINE

## 2025-03-17 PROCEDURE — 81001 URINALYSIS AUTO W/SCOPE: CPT

## 2025-03-17 PROCEDURE — 83735 ASSAY OF MAGNESIUM: CPT

## 2025-03-17 PROCEDURE — 83605 ASSAY OF LACTIC ACID: CPT

## 2025-03-17 PROCEDURE — 2580000003 HC RX 258: Performed by: STUDENT IN AN ORGANIZED HEALTH CARE EDUCATION/TRAINING PROGRAM

## 2025-03-17 PROCEDURE — 71260 CT THORAX DX C+: CPT

## 2025-03-17 PROCEDURE — 84100 ASSAY OF PHOSPHORUS: CPT

## 2025-03-17 PROCEDURE — 96375 TX/PRO/DX INJ NEW DRUG ADDON: CPT

## 2025-03-17 PROCEDURE — 36415 COLL VENOUS BLD VENIPUNCTURE: CPT

## 2025-03-17 PROCEDURE — 94640 AIRWAY INHALATION TREATMENT: CPT

## 2025-03-17 PROCEDURE — 87086 URINE CULTURE/COLONY COUNT: CPT

## 2025-03-17 PROCEDURE — 6360000004 HC RX CONTRAST MEDICATION: Performed by: NURSE PRACTITIONER

## 2025-03-17 PROCEDURE — 2580000003 HC RX 258: Performed by: NURSE PRACTITIONER

## 2025-03-17 PROCEDURE — 85027 COMPLETE CBC AUTOMATED: CPT

## 2025-03-17 RX ORDER — GLUCAGON 1 MG/ML
1 KIT INJECTION PRN
Status: DISCONTINUED | OUTPATIENT
Start: 2025-03-17 | End: 2025-03-21 | Stop reason: HOSPADM

## 2025-03-17 RX ORDER — METOPROLOL TARTRATE 50 MG
100 TABLET ORAL 2 TIMES DAILY
Status: DISCONTINUED | OUTPATIENT
Start: 2025-03-17 | End: 2025-03-21 | Stop reason: HOSPADM

## 2025-03-17 RX ORDER — DEXTROSE MONOHYDRATE 100 MG/ML
INJECTION, SOLUTION INTRAVENOUS CONTINUOUS PRN
Status: DISCONTINUED | OUTPATIENT
Start: 2025-03-17 | End: 2025-03-21 | Stop reason: HOSPADM

## 2025-03-17 RX ORDER — SODIUM CHLORIDE 0.9 % (FLUSH) 0.9 %
5-40 SYRINGE (ML) INJECTION EVERY 12 HOURS SCHEDULED
Status: DISCONTINUED | OUTPATIENT
Start: 2025-03-17 | End: 2025-03-21 | Stop reason: HOSPADM

## 2025-03-17 RX ORDER — MAGNESIUM SULFATE IN WATER 40 MG/ML
2000 INJECTION, SOLUTION INTRAVENOUS PRN
Status: DISCONTINUED | OUTPATIENT
Start: 2025-03-17 | End: 2025-03-21 | Stop reason: HOSPADM

## 2025-03-17 RX ORDER — SODIUM CHLORIDE 0.9 % (FLUSH) 0.9 %
5-40 SYRINGE (ML) INJECTION PRN
Status: DISCONTINUED | OUTPATIENT
Start: 2025-03-17 | End: 2025-03-21 | Stop reason: HOSPADM

## 2025-03-17 RX ORDER — INSULIN LISPRO 100 [IU]/ML
0-8 INJECTION, SOLUTION INTRAVENOUS; SUBCUTANEOUS
Status: DISCONTINUED | OUTPATIENT
Start: 2025-03-17 | End: 2025-03-21 | Stop reason: HOSPADM

## 2025-03-17 RX ORDER — OSELTAMIVIR PHOSPHATE 30 MG/1
30 CAPSULE ORAL 2 TIMES DAILY
Status: DISCONTINUED | OUTPATIENT
Start: 2025-03-17 | End: 2025-03-18

## 2025-03-17 RX ORDER — ALLOPURINOL 100 MG/1
100 TABLET ORAL DAILY
Status: DISCONTINUED | OUTPATIENT
Start: 2025-03-17 | End: 2025-03-21 | Stop reason: HOSPADM

## 2025-03-17 RX ORDER — IOPAMIDOL 755 MG/ML
75 INJECTION, SOLUTION INTRAVASCULAR
Status: COMPLETED | OUTPATIENT
Start: 2025-03-17 | End: 2025-03-17

## 2025-03-17 RX ORDER — ALBUTEROL SULFATE 0.83 MG/ML
2.5 SOLUTION RESPIRATORY (INHALATION) EVERY 4 HOURS PRN
Status: DISCONTINUED | OUTPATIENT
Start: 2025-03-17 | End: 2025-03-21 | Stop reason: HOSPADM

## 2025-03-17 RX ORDER — SODIUM CHLORIDE 9 MG/ML
INJECTION, SOLUTION INTRAVENOUS PRN
Status: DISCONTINUED | OUTPATIENT
Start: 2025-03-17 | End: 2025-03-21 | Stop reason: HOSPADM

## 2025-03-17 RX ORDER — ACETAMINOPHEN 650 MG/1
650 SUPPOSITORY RECTAL EVERY 6 HOURS PRN
Status: DISCONTINUED | OUTPATIENT
Start: 2025-03-17 | End: 2025-03-21 | Stop reason: HOSPADM

## 2025-03-17 RX ORDER — ONDANSETRON 2 MG/ML
4 INJECTION INTRAMUSCULAR; INTRAVENOUS EVERY 6 HOURS PRN
Status: DISCONTINUED | OUTPATIENT
Start: 2025-03-17 | End: 2025-03-21 | Stop reason: HOSPADM

## 2025-03-17 RX ORDER — ONDANSETRON 4 MG/1
4 TABLET, ORALLY DISINTEGRATING ORAL EVERY 8 HOURS PRN
Status: DISCONTINUED | OUTPATIENT
Start: 2025-03-17 | End: 2025-03-21 | Stop reason: HOSPADM

## 2025-03-17 RX ORDER — PRAVASTATIN SODIUM 20 MG
40 TABLET ORAL EVERY EVENING
Status: DISCONTINUED | OUTPATIENT
Start: 2025-03-17 | End: 2025-03-21 | Stop reason: HOSPADM

## 2025-03-17 RX ORDER — IOPAMIDOL 755 MG/ML
75 INJECTION, SOLUTION INTRAVASCULAR
Status: DISCONTINUED | OUTPATIENT
Start: 2025-03-17 | End: 2025-03-17 | Stop reason: HOSPADM

## 2025-03-17 RX ORDER — POTASSIUM CHLORIDE 7.45 MG/ML
10 INJECTION INTRAVENOUS PRN
Status: DISCONTINUED | OUTPATIENT
Start: 2025-03-17 | End: 2025-03-21 | Stop reason: HOSPADM

## 2025-03-17 RX ORDER — IPRATROPIUM BROMIDE AND ALBUTEROL SULFATE 2.5; .5 MG/3ML; MG/3ML
1 SOLUTION RESPIRATORY (INHALATION)
Status: DISCONTINUED | OUTPATIENT
Start: 2025-03-17 | End: 2025-03-19

## 2025-03-17 RX ORDER — LISINOPRIL 10 MG/1
10 TABLET ORAL DAILY
Status: DISCONTINUED | OUTPATIENT
Start: 2025-03-17 | End: 2025-03-21 | Stop reason: HOSPADM

## 2025-03-17 RX ORDER — OSELTAMIVIR PHOSPHATE 75 MG/1
75 CAPSULE ORAL ONCE
Status: COMPLETED | OUTPATIENT
Start: 2025-03-17 | End: 2025-03-17

## 2025-03-17 RX ORDER — ENOXAPARIN SODIUM 100 MG/ML
40 INJECTION SUBCUTANEOUS DAILY
Status: DISCONTINUED | OUTPATIENT
Start: 2025-03-17 | End: 2025-03-21 | Stop reason: HOSPADM

## 2025-03-17 RX ORDER — POLYETHYLENE GLYCOL 3350 17 G/17G
17 POWDER, FOR SOLUTION ORAL DAILY PRN
Status: DISCONTINUED | OUTPATIENT
Start: 2025-03-17 | End: 2025-03-21 | Stop reason: HOSPADM

## 2025-03-17 RX ORDER — POTASSIUM CHLORIDE 1500 MG/1
40 TABLET, EXTENDED RELEASE ORAL PRN
Status: DISCONTINUED | OUTPATIENT
Start: 2025-03-17 | End: 2025-03-21 | Stop reason: HOSPADM

## 2025-03-17 RX ORDER — ACETAMINOPHEN 325 MG/1
650 TABLET ORAL EVERY 6 HOURS PRN
Status: DISCONTINUED | OUTPATIENT
Start: 2025-03-17 | End: 2025-03-21 | Stop reason: HOSPADM

## 2025-03-17 RX ORDER — 0.9 % SODIUM CHLORIDE 0.9 %
1000 INTRAVENOUS SOLUTION INTRAVENOUS ONCE
Status: COMPLETED | OUTPATIENT
Start: 2025-03-17 | End: 2025-03-17

## 2025-03-17 RX ORDER — AMLODIPINE BESYLATE 5 MG/1
10 TABLET ORAL DAILY
Status: DISCONTINUED | OUTPATIENT
Start: 2025-03-17 | End: 2025-03-21 | Stop reason: HOSPADM

## 2025-03-17 RX ORDER — SODIUM CHLORIDE, SODIUM LACTATE, POTASSIUM CHLORIDE, CALCIUM CHLORIDE 600; 310; 30; 20 MG/100ML; MG/100ML; MG/100ML; MG/100ML
INJECTION, SOLUTION INTRAVENOUS CONTINUOUS
Status: DISCONTINUED | OUTPATIENT
Start: 2025-03-17 | End: 2025-03-18

## 2025-03-17 RX ADMIN — OSELTAMIVIR PHOSPHATE 30 MG: 30 CAPSULE ORAL at 20:54

## 2025-03-17 RX ADMIN — SODIUM CHLORIDE 1000 ML: 0.9 INJECTION, SOLUTION INTRAVENOUS at 00:35

## 2025-03-17 RX ADMIN — METOPROLOL TARTRATE 100 MG: 50 TABLET, FILM COATED ORAL at 08:45

## 2025-03-17 RX ADMIN — ENOXAPARIN SODIUM 40 MG: 100 INJECTION SUBCUTANEOUS at 08:45

## 2025-03-17 RX ADMIN — ALBUTEROL SULFATE 5 MG: 2.5 SOLUTION RESPIRATORY (INHALATION) at 00:57

## 2025-03-17 RX ADMIN — METOPROLOL TARTRATE 100 MG: 50 TABLET, FILM COATED ORAL at 20:54

## 2025-03-17 RX ADMIN — WATER 1000 MG: 1 INJECTION INTRAMUSCULAR; INTRAVENOUS; SUBCUTANEOUS at 00:36

## 2025-03-17 RX ADMIN — PRAVASTATIN SODIUM 40 MG: 40 TABLET ORAL at 17:20

## 2025-03-17 RX ADMIN — Medication 10 ML: at 20:58

## 2025-03-17 RX ADMIN — SODIUM CHLORIDE, POTASSIUM CHLORIDE, SODIUM LACTATE AND CALCIUM CHLORIDE: 600; 310; 30; 20 INJECTION, SOLUTION INTRAVENOUS at 02:36

## 2025-03-17 RX ADMIN — IPRATROPIUM BROMIDE AND ALBUTEROL SULFATE 1 DOSE: .5; 3 SOLUTION RESPIRATORY (INHALATION) at 22:16

## 2025-03-17 RX ADMIN — ALLOPURINOL 100 MG: 100 TABLET ORAL at 08:45

## 2025-03-17 RX ADMIN — VANCOMYCIN HYDROCHLORIDE 2000 MG: 10 INJECTION, POWDER, LYOPHILIZED, FOR SOLUTION INTRAVENOUS at 03:06

## 2025-03-17 RX ADMIN — IPRATROPIUM BROMIDE AND ALBUTEROL SULFATE 1 DOSE: .5; 3 SOLUTION RESPIRATORY (INHALATION) at 07:46

## 2025-03-17 RX ADMIN — PRAVASTATIN SODIUM 40 MG: 40 TABLET ORAL at 02:51

## 2025-03-17 RX ADMIN — IOPAMIDOL 75 ML: 755 INJECTION, SOLUTION INTRAVENOUS at 00:16

## 2025-03-17 RX ADMIN — AZITHROMYCIN MONOHYDRATE 500 MG: 500 INJECTION, POWDER, LYOPHILIZED, FOR SOLUTION INTRAVENOUS at 00:40

## 2025-03-17 RX ADMIN — METOPROLOL TARTRATE 100 MG: 50 TABLET, FILM COATED ORAL at 02:51

## 2025-03-17 RX ADMIN — OSELTAMIVIR 75 MG: 75 CAPSULE ORAL at 02:51

## 2025-03-17 RX ADMIN — LISINOPRIL 10 MG: 10 TABLET ORAL at 08:45

## 2025-03-17 RX ADMIN — AMLODIPINE BESYLATE 10 MG: 5 TABLET ORAL at 08:45

## 2025-03-17 RX ADMIN — Medication 10 ML: at 08:52

## 2025-03-17 RX ADMIN — IPRATROPIUM BROMIDE AND ALBUTEROL SULFATE 1 DOSE: .5; 3 SOLUTION RESPIRATORY (INHALATION) at 12:43

## 2025-03-17 RX ADMIN — SODIUM CHLORIDE, POTASSIUM CHLORIDE, SODIUM LACTATE AND CALCIUM CHLORIDE: 600; 310; 30; 20 INJECTION, SOLUTION INTRAVENOUS at 13:46

## 2025-03-17 RX ADMIN — CEFEPIME 2000 MG: 2 INJECTION, POWDER, FOR SOLUTION INTRAVENOUS at 02:37

## 2025-03-17 RX ADMIN — ACETAMINOPHEN 650 MG: 325 TABLET ORAL at 08:48

## 2025-03-17 ASSESSMENT — ENCOUNTER SYMPTOMS
SHORTNESS OF BREATH: 1
ABDOMINAL PAIN: 0
DIARRHEA: 0
VOMITING: 0
COUGH: 1
CHEST TIGHTNESS: 1
NAUSEA: 0

## 2025-03-17 ASSESSMENT — PAIN DESCRIPTION - LOCATION: LOCATION: HEAD

## 2025-03-17 ASSESSMENT — PAIN SCALES - GENERAL: PAINLEVEL_OUTOF10: 5

## 2025-03-17 NOTE — RT PROTOCOL NOTE
RT Nebulizer Bronchodilator Protocol Note    There is a bronchodilator order in the chart from a provider indicating to follow the RT Bronchodilator Protocol and there is an “Initiate RT Bronchodilator Protocol” order as well (see protocol at bottom of note).    CXR Findings:  XR CHEST PORTABLE  Result Date: 3/16/2025  Right basilar airspace opacity, atelectasis versus pneumonia.       The findings from the last RT Protocol Assessment were as follows:  Smoking: Chronic pulmonary disease  Respiratory Pattern: Dyspnea on exertion or RR 21-25 bpm  Breath Sounds: Slightly diminished and/or crackles  Cough: Strong, productive  Indication for Bronchodilator Therapy: On home bronchodilators  Bronchodilator Assessment Score: 7    Aerosolized bronchodilator medication orders have been revised according to the RT Nebulizer Bronchodilator Protocol below.    Respiratory Therapist to perform RT Therapy Protocol Assessment initially then follow the protocol.  Repeat RT Therapy Protocol Assessment PRN for score 0-3 or on second treatment, BID, and PRN for scores above 3.    No Indications - adjust the frequency to every 6 hours PRN wheezing or bronchospasm, if no treatments needed after 48 hours then discontinue using Per Protocol order mode.     If indication present, adjust the RT bronchodilator orders based on the Bronchodilator Assessment Score as indicated below.  If a patient is on this medication at home then do not decrease Frequency below that used at home.    0-3 - enter or revise RT bronchodilator order(s) to equivalent RT Bronchodilator order with Frequency of every 4 hours PRN for wheezing or increased work of breathing using Per Protocol order mode.       4-6 - enter or revise RT Bronchodilator order(s) to two equivalent RT bronchodilator orders with one order with BID Frequency and one order with Frequency of every 4 hours PRN wheezing or increased work of breathing using Per Protocol order mode.         7-10 -

## 2025-03-17 NOTE — ED PROVIDER NOTES
[facility] ICU  EMERGENCY DEPARTMENT ENCOUNTER        Pt Name: Duy Gant  MRN: [id number]  Birthdate 1955  Date of evaluation: 3/16/2025  Provider: NANCY Garibay - MARIA DE JESUS  PCP: Pino Lundberg MD  Note Started: 5:07 AM EDT 3/17/25       I have seen and evaluated this patient with my supervising physician tiago      CHIEF COMPLAINT       Chief Complaint   Patient presents with    Shortness of Breath     Patient arrived by [facility] with SOB x 1 week. Worse in the last 1-2 days. Two Duoneb given in route and 60 mg Solumedrol in route by EMS. Initial oxygen 83% in route. Arrived on NRB.        HISTORY OF PRESENT ILLNESS: 1 or more Elements     History From: patient and EMS  Limitations to history : None    Duy Gant is a 70 y.o. male who presents to the emergency department with complaint of shortness of breath and productive cough over the past 1 week, worse over the past 1 or 2 days.  EMS transported the patient to give him 60 mg of Solu-Medrol and 2 DuoNeb treatments.  Reports that he was hypoxic initially at 83% on room air, no history of supplemental oxygen.    Patient is ill-appearing, conversationally dyspneic.  Productive cough present.    Denies any lightheadedness, dizziness, visual disturbances.  No chest pain or pressure.  No neck or back pain.  No abdominal pain, nausea, vomiting, diarrhea, constipation, or dysuria.  No rash.    Nursing Notes were all reviewed and agreed with or any disagreements were addressed in the HPI.    REVIEW OF SYSTEMS :      Review of Systems   Constitutional:  Positive for chills, fatigue and fever. Negative for activity change.   HENT:  Positive for congestion.    Respiratory:  Positive for cough, chest tightness and shortness of breath.    Cardiovascular:  Negative for chest pain.   Gastrointestinal:  Negative for abdominal pain, diarrhea, nausea and vomiting.   Genitourinary:  Negative for dysuria.   All other systems reviewed and are  Message sent to inpatient team via [employer]. [PB]   0127 Troponin, High Sensitivity(!): 32  Negative repeat [PB]      ED Course User Index  [PB] Rivera Benjamin, DO        Chronic Conditions affecting care: none   has a past medical history of Gout, Hyperlipidemia, Hypertension, Osteomyelitis, ankle and foot (HCC), and Unspecified cerebral artery occlusion with cerebral infarction.    CONSULTS: (Who and What was discussed)  IP CONSULT TO HOSPITALIST  PHARMACY TO DOSE VANCOMYCIN      Social Determinants Significantly Affecting Health : None    Records Reviewed (External, Source and Summary) dr. Lundberg primary care hypertension outpatient visit    CC/HPI Summary, DDx, ED Course, and Reassessment:     Briefly, this is a 70 year old male who presents to the emergency department with complaint of shortness of breath and productive cough over the past 1 week, worse over the past 1 or 2 days.  EMS transported the patient to give him 60 mg of Solu-Medrol and 2 DuoNeb treatments.  Reports that he was hypoxic initially at 83% on room air, no history of supplemental oxygen.    Patient is ill-appearing, conversationally dyspneic.  Productive cough present.    Initial troponin 33, repeat troponin 32.  Initial lactate 2.4, repeat lactate 2.4.  CBC shows a leukocytosis with a white blood cell count of 22.2.  CMP shows CKD, at her about patient's baseline.  Magnesium 1.79.  .  D-dimer 1.05.  Blood gas shows a pH of 7.34 and pCO2 of 41.3.  Influenza A positive.    XR CHEST PORTABLE (Final result)  Result time 03/16/25 23:52:03  Procedure changed from XR CHEST (SINGLE VIEW FRONTAL)  Final result by Carla Valenzuela MD (03/16/25 23:52:03)                Impression:    Right basilar airspace opacity, atelectasis versus pneumonia.              CT CHEST PULMONARY EMBOLISM W CONTRAST (Final result)  Result time 03/17/25 00:47:56  Final result by Villa Cunningham MD (03/17/25 00:47:56)                Impression:    1. Motion degraded

## 2025-03-17 NOTE — PROGRESS NOTES
03/17/25 0453   RT Protocol   History Pulmonary Disease 2   Respiratory pattern 2   Breath sounds 2   Cough 1   Indications for Bronchodilator Therapy On home bronchodilators   Bronchodilator Assessment Score 7

## 2025-03-17 NOTE — ED PROVIDER NOTES
EMERGENCY DEPARTMENT PROVIDER NOTE         PATIENT IDENTIFICATION     Name:   Duy Gant  MRN:   [id number]  YOB: 1955  Date of Evaluation:   3/16/2025  Provider:   Ros Huntley NP; Rivera Benjamin DO  PCP:   Pino Lundberg MD        CHIEF COMPLAINT       Shortness of Breath (Patient arrived by [facility] with SOB x 1 week. Worse in the last 1-2 days. Two Duoneb given in route and 60 mg Solumedrol in route by EMS. Initial oxygen 83% in route. Arrived on NRB. )        HISTORY OF PRESENT ILLNESS     I independently interviewed patient and/or caretaker(s).  See Advanced Practice Provider (BRITTNEY) note for full HPI.  In summary, Duy Gant  is a(n) 70 y.o. male who presents with shortness of breath and productive cough for the last week or so but is worsened over the last 24-48 hours.  Patient was reportedly hypoxic while in route.        PHYSICAL EXAM     I reviewed physical exam performed and documented by BRITTNEY.  I performed an independent physical examination with findings as follows:  Ill-appearing gentleman with diffuse rhonchi in the right greater than left lung fields.  Tachycardia.        EKG INTERPRETATION     TIME:     RATE:   108 bpm  UT INTERVAL:   144 ms  QRS DURATION:   74 ms  QT/QTc:   324/434 ms  RHYTHM:   Sinus tachycardia  AXIS:   Regular  ABNORMALITIES  No STEMI  T wave inversions in lead(s) V6    PRIOR EK/15/2013- T wave inversions appear new    INTERPRETATION:  Nonspecific    REVIEWED BY:   Rivera Benjamin Jr., DO    EKG was independently reviewed by emergency department physician in absence of cardiologist.        LAB RESULTS     Results for orders placed or performed during the hospital encounter of 25   COVID-19 & Influenza Combo    Specimen: Nasopharyngeal Swab   Result Value Ref Range    SARS-CoV-2 RNA, RT PCR NOT DETECTED NOT DETECTED    Influenza A DETECTED (A) NOT DETECTED    Influenza B NOT DETECTED NOT DETECTED   CBC with Auto Differential   Result

## 2025-03-17 NOTE — PROGRESS NOTES
Clinical Pharmacy Note: Pharmacy to Dose Vancomycin    Duy Gant is a 70 y.o. male started on Vancomycin for CAP; consult received from Dr. Jha to manage therapy. Also receiving the following antibiotics: cefepime.    Goal AUC: 400-600 mg/L*hr    Assessment/Plan:  A 2000 mg loading dose was given on 03/17 at 0307  Initiate vancomycin 1500 mg IV every 24 hours. Bayesian modeling predicts an AUC of 529 mg/L*hr and a trough of 14.9 mcg/mL at steady state concentration.  A vancomycin random level has been ordered for 03/18 at 0600  Changes in regimen will be determined based on culture results, renal function, and clinical response.  Pharmacy will continue to monitor and adjust regimen as necessary.    Allergies:  Patient has no known allergies.     Recent Labs     03/16/25  2325   CREATININE 1.5*       Recent Labs     03/16/25  2325   WBC 22.2*       Ht Readings from Last 1 Encounters:   03/16/25 1.829 m (6')        Wt Readings from Last 1 Encounters:   03/17/25 99.9 kg (220 lb 3.8 oz)         Estimated Creatinine Clearance: 54 mL/min (A) (based on SCr of 1.5 mg/dL (H)).      Thank you for the consult,    Ananda Contreras, ElsiD

## 2025-03-17 NOTE — PROGRESS NOTES
Pt on unit from ICU report received VSS pt denies any pain 2Lnc pt orientated to room bed controls and call light. IV ATB infusing

## 2025-03-17 NOTE — ED NOTES
Patient Name: Duy Gant  : 1955 70 y.o.  MRN: [id number]  ED Room #: ED-0008/     Chief complaint:   Chief Complaint   Patient presents with    Shortness of Breath     Patient arrived by [facility] with SOB x 1 week. Worse in the last 1-2 days. Two Duoneb given in route and 60 mg Solumedrol in route by EMS. Initial oxygen 83% in route. Arrived on NRB.      Hospital Problem/Diagnosis:   Hospital Problems           Last Modified POA    * (Principal) Community acquired bacterial pneumonia 3/17/2025 Yes         O2 Flow Rate:O2 Device: Nasal cannula O2 Flow Rate (L/min): 2 L/min (if applicable)  Cardiac Rhythm:   (if applicable)  Active LDA's:   Peripheral IV 25 Left Antecubital (Active)   Site Assessment Clean, dry & intact 25   Line Status Blood return noted;Flushed;Normal saline locked 25            How does patient ambulate? Stand by assist    2. How does patient take pills? Whole with Water    3. Is patient alert? Alert    4. Is patient oriented? To Person, To Place, To Time, To Situation, and Follows Commands    5.   Patient arrived from:  home  Facility Name: ___________________________________________    6. If patient is disoriented or from a Skill Nursing Facility has family been notified of admission?  NO    7. Patient belongings? Belongings: Clothing    Disposition of belongings? Kept with Patient     8. Any specific patient or family belongings/needs/dynamics?   a. NA    9. Miscellaneous comments/pending orders?  a. NA      If there are any additional questions please reach out to the Emergency Department.

## 2025-03-17 NOTE — PROGRESS NOTES
4 Eyes Skin Assessment     NAME:  Duy Gant  YOB: 1955  MEDICAL RECORD NUMBER:  [id number]    The patient is being assessed for  Admission    I agree that at least one RN has performed a thorough Head to Toe Skin Assessment on the patient. ALL assessment sites listed below have been assessed.      Areas assessed by both nurses:    Head, Face, Ears, Shoulders, Back, Chest, Arms, Elbows, Hands, Sacrum. Buttock, Coccyx, Ischium, Legs. Feet and Heels, and Under Medical Devices         Does the Patient have a Wound? No noted wound(s)       Ashwin Prevention initiated by RN: No  Wound Care Orders initiated by RN: No    Pressure Injury (Stage 3,4, Unstageable, DTI, NWPT, and Complex wounds) if present, place Wound referral order by RN under : No    New Ostomies, if present place, Ostomy referral order under : No     Nurse 1 eSignature: Electronically signed by Kimber Harry RN on 3/17/25 at 2:24 AM EDT    **SHARE this note so that the co-signing nurse can place an eSignature**    Nurse 2 eSignature: Electronically signed by Sahra Matt RN on 3/17/25 at 4:12 AM EDT

## 2025-03-17 NOTE — H&P
V2.0  History and Physical      Name:  Duy Gant /Age/Sex: 1955  (70 y.o. male)   MRN & CSN:  [id number] & [id number] Encounter Date/Time: 3/17/2025 4:22 AM EDT   Location:  [id number] PCP: Pino Lundberg MD       Hospital Day: 2    Assessment and Plan:   Duy Gant is a 70 y.o. male  who presents with Community acquired bacterial pneumonia    Hospital Problems           Last Modified POA    * (Principal) Community acquired bacterial pneumonia 3/17/2025 Yes       Assessment and Plan:  Acute hypoxic respiratory failure in the setting of community-acquired bacterial pneumonia with underlying influenza A infection.  Started on Tamiflu along with vancomycin and cefepime as well as DuoNeb and Solu-Medrol.  Telemetry in place  Benign essential hypertension continue with amlodipine and metoprolol holding lisinopril for now  Hyperlipidemia on pravastatin  History of gout on indomethacin allopurinol colchicine at home currently no signs of flareup of gout we are holding the medications may need to use if symptoms develop      Home Meds Documented: [x] Yes [] Reconciled as much as possible (based on acuity) through chart review and patient discussion, need completion [] Needs Reconciled    Medical Decision Making:  The following items were considered in medical decision making:  Discussion of patient care with other providers  Reviewed clinical lab tests if any  Reviewed radiology tests if any  Reviewed other diagnostic tests/interventions  Independent review of radiologic images if any  Microbiology cultures and other micro tests if any    Estimated time spent for medical decision-making encompassing complexity of the case, history taking, medication review, physical examination, communication with family, RN, , discussion with specialists, and ancillary staff members to provide accurate care for the patient was [id number] minutes.    MDM (any 2 required for High level billing)     JUSTIN

## 2025-03-17 NOTE — CARE COORDINATION
Discharge Planning Note:    Chart reviewed and it appears that patient has minimal needs for discharge at this time. Risk Score 9 %     Primary Care Physician is Pino Lundberg MD    Primary insurance is [facility]    Please notify case management if any discharge needs are identified.      Case management will continue to follow progress and update discharge plan as needed.      Electronically signed by Radha Conroy RN on 3/17/2025 at 10:40 AM

## 2025-03-18 LAB
ANION GAP SERPL CALCULATED.3IONS-SCNC: 11 MMOL/L (ref 3–16)
BACTERIA UR CULT: NORMAL
BUN SERPL-MCNC: 23 MG/DL (ref 7–20)
CALCIUM SERPL-MCNC: 9 MG/DL (ref 8.3–10.6)
CHLORIDE SERPL-SCNC: 104 MMOL/L (ref 99–110)
CO2 SERPL-SCNC: 21 MMOL/L (ref 21–32)
CREAT SERPL-MCNC: 1.1 MG/DL (ref 0.8–1.3)
DEPRECATED RDW RBC AUTO: 13 % (ref 12.4–15.4)
GFR SERPLBLD CREATININE-BSD FMLA CKD-EPI: 71 ML/MIN/{1.73_M2}
GLUCOSE BLD-MCNC: 107 MG/DL (ref 70–99)
GLUCOSE BLD-MCNC: 116 MG/DL (ref 70–99)
GLUCOSE BLD-MCNC: 97 MG/DL (ref 70–99)
GLUCOSE BLD-MCNC: 98 MG/DL (ref 70–99)
GLUCOSE SERPL-MCNC: 110 MG/DL (ref 70–99)
HCT VFR BLD AUTO: 39.4 % (ref 40.5–52.5)
HGB BLD-MCNC: 13.1 G/DL (ref 13.5–17.5)
MAGNESIUM SERPL-MCNC: 1.86 MG/DL (ref 1.8–2.4)
MCH RBC QN AUTO: 30.8 PG (ref 26–34)
MCHC RBC AUTO-ENTMCNC: 33.3 G/DL (ref 31–36)
MCV RBC AUTO: 92.3 FL (ref 80–100)
MRSA DNA SPEC QL NAA+PROBE: NORMAL
PERFORMED ON: ABNORMAL
PERFORMED ON: ABNORMAL
PERFORMED ON: NORMAL
PERFORMED ON: NORMAL
PHOSPHATE SERPL-MCNC: 2.5 MG/DL (ref 2.5–4.9)
PLATELET # BLD AUTO: 283 K/UL (ref 135–450)
PMV BLD AUTO: 8.9 FL (ref 5–10.5)
POTASSIUM SERPL-SCNC: 4 MMOL/L (ref 3.5–5.1)
RBC # BLD AUTO: 4.27 M/UL (ref 4.2–5.9)
SODIUM SERPL-SCNC: 136 MMOL/L (ref 136–145)
WBC # BLD AUTO: 18.8 K/UL (ref 4–11)

## 2025-03-18 PROCEDURE — 97165 OT EVAL LOW COMPLEX 30 MIN: CPT

## 2025-03-18 PROCEDURE — 94669 MECHANICAL CHEST WALL OSCILL: CPT

## 2025-03-18 PROCEDURE — 6370000000 HC RX 637 (ALT 250 FOR IP): Performed by: STUDENT IN AN ORGANIZED HEALTH CARE EDUCATION/TRAINING PROGRAM

## 2025-03-18 PROCEDURE — 2500000003 HC RX 250 WO HCPCS: Performed by: STUDENT IN AN ORGANIZED HEALTH CARE EDUCATION/TRAINING PROGRAM

## 2025-03-18 PROCEDURE — 97535 SELF CARE MNGMENT TRAINING: CPT

## 2025-03-18 PROCEDURE — 94761 N-INVAS EAR/PLS OXIMETRY MLT: CPT

## 2025-03-18 PROCEDURE — 36415 COLL VENOUS BLD VENIPUNCTURE: CPT

## 2025-03-18 PROCEDURE — 97116 GAIT TRAINING THERAPY: CPT

## 2025-03-18 PROCEDURE — 6360000002 HC RX W HCPCS: Performed by: STUDENT IN AN ORGANIZED HEALTH CARE EDUCATION/TRAINING PROGRAM

## 2025-03-18 PROCEDURE — 97530 THERAPEUTIC ACTIVITIES: CPT

## 2025-03-18 PROCEDURE — 84100 ASSAY OF PHOSPHORUS: CPT

## 2025-03-18 PROCEDURE — 97161 PT EVAL LOW COMPLEX 20 MIN: CPT

## 2025-03-18 PROCEDURE — 83735 ASSAY OF MAGNESIUM: CPT

## 2025-03-18 PROCEDURE — 85027 COMPLETE CBC AUTOMATED: CPT

## 2025-03-18 PROCEDURE — 94640 AIRWAY INHALATION TREATMENT: CPT

## 2025-03-18 PROCEDURE — 80048 BASIC METABOLIC PNL TOTAL CA: CPT

## 2025-03-18 PROCEDURE — 6370000000 HC RX 637 (ALT 250 FOR IP): Performed by: NURSE PRACTITIONER

## 2025-03-18 PROCEDURE — 6370000000 HC RX 637 (ALT 250 FOR IP): Performed by: INTERNAL MEDICINE

## 2025-03-18 PROCEDURE — 1200000000 HC SEMI PRIVATE

## 2025-03-18 PROCEDURE — 2580000003 HC RX 258: Performed by: STUDENT IN AN ORGANIZED HEALTH CARE EDUCATION/TRAINING PROGRAM

## 2025-03-18 RX ORDER — BENZONATATE 100 MG/1
100 CAPSULE ORAL 3 TIMES DAILY PRN
Status: DISCONTINUED | OUTPATIENT
Start: 2025-03-18 | End: 2025-03-21 | Stop reason: HOSPADM

## 2025-03-18 RX ORDER — HYDROCODONE BITARTRATE AND ACETAMINOPHEN 5; 325 MG/1; MG/1
1 TABLET ORAL EVERY 6 HOURS PRN
Status: DISCONTINUED | OUTPATIENT
Start: 2025-03-18 | End: 2025-03-21 | Stop reason: HOSPADM

## 2025-03-18 RX ORDER — GUAIFENESIN 600 MG/1
600 TABLET, EXTENDED RELEASE ORAL 2 TIMES DAILY
Status: DISCONTINUED | OUTPATIENT
Start: 2025-03-18 | End: 2025-03-21

## 2025-03-18 RX ORDER — GUAIFENESIN/DEXTROMETHORPHAN 100-10MG/5
5 SYRUP ORAL EVERY 4 HOURS
Status: DISCONTINUED | OUTPATIENT
Start: 2025-03-18 | End: 2025-03-20

## 2025-03-18 RX ORDER — OSELTAMIVIR PHOSPHATE 75 MG/1
75 CAPSULE ORAL 2 TIMES DAILY
Status: DISCONTINUED | OUTPATIENT
Start: 2025-03-18 | End: 2025-03-21 | Stop reason: HOSPADM

## 2025-03-18 RX ADMIN — BENZONATATE 100 MG: 100 CAPSULE ORAL at 07:13

## 2025-03-18 RX ADMIN — LISINOPRIL 10 MG: 10 TABLET ORAL at 09:55

## 2025-03-18 RX ADMIN — AZITHROMYCIN MONOHYDRATE 500 MG: 500 INJECTION, POWDER, LYOPHILIZED, FOR SOLUTION INTRAVENOUS at 00:21

## 2025-03-18 RX ADMIN — GUAIFENESIN AND DEXTROMETHORPHAN 5 ML: 100; 10 SYRUP ORAL at 14:19

## 2025-03-18 RX ADMIN — METOPROLOL TARTRATE 100 MG: 50 TABLET, FILM COATED ORAL at 09:55

## 2025-03-18 RX ADMIN — WATER 1000 MG: 1 INJECTION INTRAMUSCULAR; INTRAVENOUS; SUBCUTANEOUS at 23:36

## 2025-03-18 RX ADMIN — GUAIFENESIN 600 MG: 600 TABLET, EXTENDED RELEASE ORAL at 14:19

## 2025-03-18 RX ADMIN — Medication 10 ML: at 09:56

## 2025-03-18 RX ADMIN — OSELTAMIVIR PHOSPHATE 75 MG: 75 CAPSULE ORAL at 09:55

## 2025-03-18 RX ADMIN — GUAIFENESIN AND DEXTROMETHORPHAN 5 ML: 100; 10 SYRUP ORAL at 07:13

## 2025-03-18 RX ADMIN — IPRATROPIUM BROMIDE AND ALBUTEROL SULFATE 1 DOSE: .5; 3 SOLUTION RESPIRATORY (INHALATION) at 15:11

## 2025-03-18 RX ADMIN — GUAIFENESIN AND DEXTROMETHORPHAN 5 ML: 100; 10 SYRUP ORAL at 09:55

## 2025-03-18 RX ADMIN — METOPROLOL TARTRATE 100 MG: 50 TABLET, FILM COATED ORAL at 19:56

## 2025-03-18 RX ADMIN — WATER 1000 MG: 1 INJECTION INTRAMUSCULAR; INTRAVENOUS; SUBCUTANEOUS at 00:06

## 2025-03-18 RX ADMIN — ENOXAPARIN SODIUM 40 MG: 100 INJECTION SUBCUTANEOUS at 09:55

## 2025-03-18 RX ADMIN — IPRATROPIUM BROMIDE AND ALBUTEROL SULFATE 1 DOSE: .5; 3 SOLUTION RESPIRATORY (INHALATION) at 20:54

## 2025-03-18 RX ADMIN — Medication 10 ML: at 19:55

## 2025-03-18 RX ADMIN — PRAVASTATIN SODIUM 40 MG: 40 TABLET ORAL at 18:19

## 2025-03-18 RX ADMIN — GUAIFENESIN AND DEXTROMETHORPHAN 5 ML: 100; 10 SYRUP ORAL at 19:56

## 2025-03-18 RX ADMIN — IPRATROPIUM BROMIDE AND ALBUTEROL SULFATE 1 DOSE: .5; 3 SOLUTION RESPIRATORY (INHALATION) at 07:53

## 2025-03-18 RX ADMIN — AZITHROMYCIN MONOHYDRATE 500 MG: 500 INJECTION, POWDER, LYOPHILIZED, FOR SOLUTION INTRAVENOUS at 23:47

## 2025-03-18 RX ADMIN — AMLODIPINE BESYLATE 10 MG: 5 TABLET ORAL at 09:55

## 2025-03-18 RX ADMIN — OSELTAMIVIR PHOSPHATE 75 MG: 75 CAPSULE ORAL at 19:56

## 2025-03-18 RX ADMIN — GUAIFENESIN AND DEXTROMETHORPHAN 5 ML: 100; 10 SYRUP ORAL at 23:32

## 2025-03-18 RX ADMIN — ALLOPURINOL 100 MG: 100 TABLET ORAL at 09:55

## 2025-03-18 RX ADMIN — GUAIFENESIN 600 MG: 600 TABLET, EXTENDED RELEASE ORAL at 19:56

## 2025-03-18 NOTE — PROGRESS NOTES
[facility] - Inpatient Rehabilitation Department   Phone: [phone]    Physical Therapy    [x] Initial Evaluation            [] Daily Treatment Note         [] Discharge Summary      Patient: Duy Gant   : 1955   MRN: [id number]   Date of Service:  3/18/2025  Admitting Diagnosis: Community acquired bacterial pneumonia  Current Admission Summary: Per ED H&P: \"Duy Gant is a 70 y.o. male who presents to the emergency department with complaint of shortness of breath and productive cough over the past 1 week, worse over the past 1 or 2 days...  Reports that he was hypoxic initially at 83% on room air, no history of supplemental oxygen.   Patient is ill-appearing, conversationally dyspneic.  Productive cough present.\"   - Dx: Flu+, pneumonia  Past Medical History:  has a past medical history of Gout, Hyperlipidemia, Hypertension, Osteomyelitis, ankle and foot (HCC), and Unspecified cerebral artery occlusion with cerebral infarction.  Past Surgical History:  has no past surgical history on file.  Discharge Recommendations: Duy Gant scored a 20/24 on the AM-PAC short mobility form. Current research shows that an AM-PAC score of 18 or greater is typically associated with a discharge to the patient's home setting. Based on the patient's AM-PAC score and their current functional mobility deficits, it is recommended that the patient have 2-3 sessions per week of Physical Therapy at d/c to increase the patient's independence.  At this time, this patient demonstrates the endurance and safety to discharge home without formal physical therapy services.  Please see assessment section for further patient specific details.    If patient discharges prior to next session this note will serve as a discharge summary.  Please see below for the latest assessment towards goals.      DME Required For Discharge: no DME required at discharge  Precautions/Restrictions: high fall risk, up as tolerated  Weight  decreased posture  Prognosis: good  Clinical Assessment: Mr. Duy Gant is a 70 year old man admitted to the hospital with shortness of breath 3/16 diagnosed with flu and pneumonia. Pt is limited by the above deficits and would benefit from skilled PT services to maximize pt functional mobility and safety prior to discharge.     Safety Interventions: patient left in chair, chair alarm in place, call light within reach, gait belt, patient at risk for falls, and nurse notified    Plan  Frequency: 3-5 x/per week  Current Treatment Recommendations: strengthening, balance training, functional mobility training, transfer training, gait training, stair training, endurance training, neuromuscular re-education, patient/caregiver education, home exercise program, and safety education    Goals  Patient Goals: to return home as soon as possible   Short Term Goals:  Time Frame: Upon Discharge  Patient will complete transfers at modified independent   Patient will ambulate 100 ft with use of LRAD at [facility] independent  Patient will ascend/descend 1 stairs without use of HR at [facility] independent  Patient will complete car transfer at modified independent    Above goals reviewed on 3/18/2025.  All goals are ongoing at this time unless indicated above.      Therapy Session Time      Individual Group Co-treatment   Time In     0900   Time Out     0938   Minutes     38     Timed Code Treatment Minutes:  23 Minutes  Total Treatment Minutes:  38 Minutes       Electronically Signed By: Vanessa Escobar PT    Thanks, Vanessa Escobar PT, DPT #[id number] 3/18/2025

## 2025-03-18 NOTE — PROGRESS NOTES
[facility] - Inpatient Rehabilitation Department   Phone: [phone]    Occupational Therapy    [x] Initial Evaluation            [] Daily Treatment Note         [] Discharge Summary      Patient: Duy Gant   : 1955   MRN: [id number]   Date of Service:  3/18/2025    Admitting Diagnosis:  Community acquired bacterial pneumonia  Current Admission Summary: Per ED H&P: \"Duy Gant is a 70 y.o. male who presents to the emergency department with complaint of shortness of breath and productive cough over the past 1 week, worse over the past 1 or 2 days...  Reports that he was hypoxic initially at 83% on room air, no history of supplemental oxygen.   Patient is ill-appearing, conversationally dyspneic.  Productive cough present.\"   - Dx: Flu+, pneumonia  Past Medical History:  has a past medical history of Gout, Hyperlipidemia, Hypertension, Osteomyelitis, ankle and foot (HCC), and Unspecified cerebral artery occlusion with cerebral infarction.  Past Surgical History:  has no past surgical history on file.    Discharge Recommendations: Duy Gant scored a 21/24 on the AM-PAC ADL Inpatient form. Current research shows that an AM-PAC score of 18 or greater is typically associated with a discharge to the patient's home setting. Based on the patient's AM-PAC score, and their current ADL deficits, it is recommended that the patient have 2-3 sessions per week of Occupational Therapy at d/c to increase the patient's independence.  At this time, this patient demonstrates the endurance and safety to discharge home with home based OT (home vs OP services) and a follow up treatment frequency of 2-3x/wk.   Please see assessment section for further patient specific details.    If patient discharges prior to next session this note will serve as a discharge summary.  Please see below for the latest assessment towards goals.       DME Required For Discharge: no DME required at  Education: patient educated on goals, OT role and benefits, plan of care, ADL adaptive strategies, IADL safety, transfer training, discharge recommendations  Learning Assessment:  patient verbalizes understanding, would benefit from continued reinforcement    Assessment  Activity Tolerance: Limited by SOB. Pt on RA; however he is saturating within acceptable parameters throughout.   Impairments Requiring Therapeutic Intervention: decreased functional mobility, decreased ADL status, decreased strength, decreased safety awareness, decreased cognition, decreased endurance, decreased balance  Prognosis: good  Clinical Assessment: The patient is a 70 y.o. male who presents below their baseline level of function due to above deficits, associated with Community acquired bacterial pneumonia. Typically, pt is independent and driving. Does not DME at baseline. Currently, pt is SBA for functional mobility and ADL completion in stance. Pt with x1 minimal LOB during ambulation to/from bathroom but able to self correct CGA. Continued OT indicated in order to promote return to PLOF    Safety Interventions: patient left in chair, call light within reach, gait belt, and nurse notified    Plan  Frequency: 1-2 x/per week  Current Treatment Recommendations: strengthening, balance training, functional mobility training, transfer training, gait training, stair training, endurance training, ADL/self-care training, and IADL training    Goals  Patient Goals: return to home   Short Term Goals:  Time Frame: discharge  Patient will complete upper body ADL at Independent   Patient will complete lower body ADL at Independent   Patient will complete toileting at Independent   Patient will complete grooming at Independent   Patient will complete functional transfers at Independent     Above goals reviewed on 3/18/2025.  All goals are ongoing at this time unless indicated above.       Therapy Session Time     Individual Group Co-treatment   Time In

## 2025-03-18 NOTE — PROGRESS NOTES
Hospitalist Progress Note      Name:  Duy Gant /Age/Sex: 1955  (70 y.o. male)   MRN & CSN:  [id number] & [id number] Encounter Date/Time: 3/18/2025 7:13 AM EDT   Location:  [id number] PCP: Pino Lundberg MD     Attending:Patrice Walden MD       Hospital Day: 3    Subjective:   Chief Complaint:   Chief Complaint   Patient presents with    Shortness of Breath     Patient arrived by [facility] with SOB x 1 week. Worse in the last 1-2 days. Two Duoneb given in route and 60 mg Solumedrol in route by EMS. Initial oxygen 83% in route. Arrived on [facility].      Duy Gant is a 70 y.o. male with a past medical history of hypertension, hyperlipidemia, OM, CVA, gout who presented with SOB x 1 week. Positive for influenza A and has been admitted for acute hypoxic respiratory failure and treated for CAP.     Interval History:  Today, he s resting in bed.  He is frustrated that his IV was beeping all night.  He would like to be disconnected from the IV.  He is also having right lateral chest wall pain when he coughs.  He feels slightly better today, but still with cough and SOB.      Independently reviewed interval ancillary notes from emergency medicine.     Assessment and Recommendations   Problem List  Principal Problem:    Community acquired bacterial pneumonia  Active Problems:    Hypertension    Hypercholesterolemia    Gout  Resolved Problems:    * No resolved hospital problems. *     Assessment and Plan:    Acute hypoxic respiratory failure   Influenza A  CAP   - in the setting of community-acquired bacterial pneumonia with underlying influenza A infection   - CXR basilar atelectasis vs PNA, CTPE showed no PE and multifocal PNA  - Continue Tamiflu   - Continue IV Azithromycin and ceftriaxone   - Continue DuoNeb   - Add Mucinex and Robitussin scheduled, tessalon prn  - STOP IVF, he is declining them due to beeping of his IV pump discussed changing IV site, he is able to take in good oral intake and  declines at this time, reasonable to stop fluids   Hypertension  - Continue  amlodipine, metoprolol, lisinopril   - BP is controlled  Hyperlipidemia   - Continue pravastatin  History of gout   - on indomethacin allopurinol colchicine at home currently no signs of flareup of gout we are holding the medications may need to use if symptoms develop    Plan:   Continue IV abx  Continue duoneb  Add mucinex, robitussing, norc and tessalon     Drugs that require monitoring for toxicity include: IV ceftriaxone and IV Azithromycin and the method of monitoring was/is daily BMP and CBC.    Discussed care with patient and nursing.   All pertinent information and plan of care discussed with the attending physician.     Diet: ADULT DIET; Regular; 5 carb choices (75 gm/meal)  Code Status: Full Code  DVT Prophylaxis: Lovenox  Disposition PTA: Home   Discharge Disposition: Home no need  Surrogate Decision Maker/ POA: brothmona Carter    Review of Systems:      Pertinent positives and negatives discussed in HPI    Objective:     Intake/Output Summary (Last 24 hours) at 3/18/2025 0713  Last data filed at 3/17/2025 2058  Gross per 24 hour   Intake 5 ml   Output 950 ml   Net -945 ml      Vitals:   Vitals:    03/17/25 1651 03/17/25 2045 03/17/25 2215 03/17/25 2337   BP: (!) 157/91 (!) 158/87  (!) 147/83   Pulse: 80 71  78   Resp: 16 16  16   Temp: 98.1 °F (36.7 °C) 97.7 °F (36.5 °C)  97.4 °F (36.3 °C)   TempSrc: Oral Oral  Axillary   SpO2: 96% 97% 96% 96%   Weight:       Height:           Physical Exam:    Physical Examination:  Vitals:    03/17/25 2337   BP: (!) 147/83   Pulse: 78   Resp: 16   Temp: 97.4 °F (36.3 °C)   SpO2: 96%      In: 5 [I.V.:5]  Out: 950    Wt Readings from Last 3 Encounters:   03/17/25 99.9 kg (220 lb 3.8 oz)   09/23/24 107.5 kg (237 lb)   12/28/23 109.3 kg (241 lb)       Intake/Output Summary (Last 24 hours) at 3/18/2025 0713  Last data filed at 3/17/2025 2058  Gross per 24 hour   Intake 5 ml   Output 950 ml   Net

## 2025-03-19 LAB
ANION GAP SERPL CALCULATED.3IONS-SCNC: 10 MMOL/L (ref 3–16)
BUN SERPL-MCNC: 20 MG/DL (ref 7–20)
CALCIUM SERPL-MCNC: 8.7 MG/DL (ref 8.3–10.6)
CHLORIDE SERPL-SCNC: 102 MMOL/L (ref 99–110)
CO2 SERPL-SCNC: 23 MMOL/L (ref 21–32)
CREAT SERPL-MCNC: 1 MG/DL (ref 0.8–1.3)
DEPRECATED RDW RBC AUTO: 12.9 % (ref 12.4–15.4)
GFR SERPLBLD CREATININE-BSD FMLA CKD-EPI: 80 ML/MIN/{1.73_M2}
GLUCOSE BLD-MCNC: 101 MG/DL (ref 70–99)
GLUCOSE BLD-MCNC: 106 MG/DL (ref 70–99)
GLUCOSE BLD-MCNC: 95 MG/DL (ref 70–99)
GLUCOSE BLD-MCNC: 97 MG/DL (ref 70–99)
GLUCOSE SERPL-MCNC: 98 MG/DL (ref 70–99)
HCT VFR BLD AUTO: 39.7 % (ref 40.5–52.5)
HGB BLD-MCNC: 13.3 G/DL (ref 13.5–17.5)
MAGNESIUM SERPL-MCNC: 1.73 MG/DL (ref 1.8–2.4)
MCH RBC QN AUTO: 31 PG (ref 26–34)
MCHC RBC AUTO-ENTMCNC: 33.6 G/DL (ref 31–36)
MCV RBC AUTO: 92.2 FL (ref 80–100)
PERFORMED ON: ABNORMAL
PERFORMED ON: ABNORMAL
PERFORMED ON: NORMAL
PERFORMED ON: NORMAL
PHOSPHATE SERPL-MCNC: 2.4 MG/DL (ref 2.5–4.9)
PLATELET # BLD AUTO: 327 K/UL (ref 135–450)
PMV BLD AUTO: 8.6 FL (ref 5–10.5)
POTASSIUM SERPL-SCNC: 3.6 MMOL/L (ref 3.5–5.1)
RBC # BLD AUTO: 4.3 M/UL (ref 4.2–5.9)
SODIUM SERPL-SCNC: 135 MMOL/L (ref 136–145)
WBC # BLD AUTO: 16.2 K/UL (ref 4–11)

## 2025-03-19 PROCEDURE — 6370000000 HC RX 637 (ALT 250 FOR IP): Performed by: STUDENT IN AN ORGANIZED HEALTH CARE EDUCATION/TRAINING PROGRAM

## 2025-03-19 PROCEDURE — 36415 COLL VENOUS BLD VENIPUNCTURE: CPT

## 2025-03-19 PROCEDURE — 6360000002 HC RX W HCPCS: Performed by: STUDENT IN AN ORGANIZED HEALTH CARE EDUCATION/TRAINING PROGRAM

## 2025-03-19 PROCEDURE — 2500000003 HC RX 250 WO HCPCS: Performed by: STUDENT IN AN ORGANIZED HEALTH CARE EDUCATION/TRAINING PROGRAM

## 2025-03-19 PROCEDURE — 1200000000 HC SEMI PRIVATE

## 2025-03-19 PROCEDURE — 6370000000 HC RX 637 (ALT 250 FOR IP): Performed by: INTERNAL MEDICINE

## 2025-03-19 PROCEDURE — 97110 THERAPEUTIC EXERCISES: CPT

## 2025-03-19 PROCEDURE — 84100 ASSAY OF PHOSPHORUS: CPT

## 2025-03-19 PROCEDURE — 85027 COMPLETE CBC AUTOMATED: CPT

## 2025-03-19 PROCEDURE — 94669 MECHANICAL CHEST WALL OSCILL: CPT

## 2025-03-19 PROCEDURE — 83735 ASSAY OF MAGNESIUM: CPT

## 2025-03-19 PROCEDURE — 94640 AIRWAY INHALATION TREATMENT: CPT

## 2025-03-19 PROCEDURE — 6370000000 HC RX 637 (ALT 250 FOR IP): Performed by: NURSE PRACTITIONER

## 2025-03-19 PROCEDURE — 80048 BASIC METABOLIC PNL TOTAL CA: CPT

## 2025-03-19 PROCEDURE — 6360000002 HC RX W HCPCS: Performed by: NURSE PRACTITIONER

## 2025-03-19 RX ORDER — MAGNESIUM SULFATE 1 G/100ML
1000 INJECTION INTRAVENOUS ONCE
Status: COMPLETED | OUTPATIENT
Start: 2025-03-19 | End: 2025-03-19

## 2025-03-19 RX ORDER — IPRATROPIUM BROMIDE AND ALBUTEROL SULFATE 2.5; .5 MG/3ML; MG/3ML
1 SOLUTION RESPIRATORY (INHALATION)
Status: DISCONTINUED | OUTPATIENT
Start: 2025-03-19 | End: 2025-03-19

## 2025-03-19 RX ORDER — IPRATROPIUM BROMIDE AND ALBUTEROL SULFATE 2.5; .5 MG/3ML; MG/3ML
1 SOLUTION RESPIRATORY (INHALATION)
Status: DISCONTINUED | OUTPATIENT
Start: 2025-03-19 | End: 2025-03-21 | Stop reason: HOSPADM

## 2025-03-19 RX ADMIN — MAGNESIUM SULFATE HEPTAHYDRATE 1000 MG: 1 INJECTION, SOLUTION INTRAVENOUS at 09:08

## 2025-03-19 RX ADMIN — Medication 10 ML: at 20:01

## 2025-03-19 RX ADMIN — IPRATROPIUM BROMIDE AND ALBUTEROL SULFATE 1 DOSE: .5; 3 SOLUTION RESPIRATORY (INHALATION) at 08:17

## 2025-03-19 RX ADMIN — LISINOPRIL 10 MG: 10 TABLET ORAL at 08:58

## 2025-03-19 RX ADMIN — ACETAMINOPHEN 650 MG: 325 TABLET ORAL at 12:35

## 2025-03-19 RX ADMIN — GUAIFENESIN AND DEXTROMETHORPHAN 5 ML: 100; 10 SYRUP ORAL at 04:03

## 2025-03-19 RX ADMIN — GUAIFENESIN 600 MG: 600 TABLET, EXTENDED RELEASE ORAL at 08:59

## 2025-03-19 RX ADMIN — PRAVASTATIN SODIUM 40 MG: 40 TABLET ORAL at 18:12

## 2025-03-19 RX ADMIN — GUAIFENESIN AND DEXTROMETHORPHAN 5 ML: 100; 10 SYRUP ORAL at 18:11

## 2025-03-19 RX ADMIN — METOPROLOL TARTRATE 100 MG: 50 TABLET, FILM COATED ORAL at 08:57

## 2025-03-19 RX ADMIN — AMLODIPINE BESYLATE 10 MG: 5 TABLET ORAL at 08:57

## 2025-03-19 RX ADMIN — IPRATROPIUM BROMIDE AND ALBUTEROL SULFATE 1 DOSE: .5; 3 SOLUTION RESPIRATORY (INHALATION) at 20:54

## 2025-03-19 RX ADMIN — GUAIFENESIN AND DEXTROMETHORPHAN 5 ML: 100; 10 SYRUP ORAL at 23:07

## 2025-03-19 RX ADMIN — OSELTAMIVIR PHOSPHATE 75 MG: 75 CAPSULE ORAL at 20:01

## 2025-03-19 RX ADMIN — WATER 1000 MG: 1 INJECTION INTRAMUSCULAR; INTRAVENOUS; SUBCUTANEOUS at 23:07

## 2025-03-19 RX ADMIN — GUAIFENESIN AND DEXTROMETHORPHAN 5 ML: 100; 10 SYRUP ORAL at 12:31

## 2025-03-19 RX ADMIN — ALLOPURINOL 100 MG: 100 TABLET ORAL at 08:57

## 2025-03-19 RX ADMIN — METOPROLOL TARTRATE 100 MG: 50 TABLET, FILM COATED ORAL at 20:00

## 2025-03-19 RX ADMIN — ENOXAPARIN SODIUM 40 MG: 100 INJECTION SUBCUTANEOUS at 08:59

## 2025-03-19 RX ADMIN — GUAIFENESIN AND DEXTROMETHORPHAN 5 ML: 100; 10 SYRUP ORAL at 08:59

## 2025-03-19 RX ADMIN — GUAIFENESIN 600 MG: 600 TABLET, EXTENDED RELEASE ORAL at 20:01

## 2025-03-19 RX ADMIN — Medication 10 ML: at 09:00

## 2025-03-19 RX ADMIN — OSELTAMIVIR PHOSPHATE 75 MG: 75 CAPSULE ORAL at 08:58

## 2025-03-19 ASSESSMENT — PAIN DESCRIPTION - LOCATION: LOCATION: BACK

## 2025-03-19 ASSESSMENT — PAIN SCALES - GENERAL
PAINLEVEL_OUTOF10: 2
PAINLEVEL_OUTOF10: 3

## 2025-03-19 ASSESSMENT — PAIN DESCRIPTION - DESCRIPTORS: DESCRIPTORS: DULL

## 2025-03-19 ASSESSMENT — PAIN DESCRIPTION - ORIENTATION: ORIENTATION: LOWER

## 2025-03-19 NOTE — PLAN OF CARE
Medicare Wellness Visit  Plan for Preventive Care    A good way for you to stay healthy is to use preventive care.  Medicare covers many services that can help you stay healthy.* The goal of these services is to find any health problems as quickly as possible. Finding problems early can help make them easier to treat.  Your personal plan below lists the services you may need and when they are due.     Health Maintenance Summary     Topic Due On Due Status Completed On    Colorectal Cancer Screening - Colonoscopy Jun 7, 2021 Not Due Jun 7, 2011    Immunization-Zoster Apr 20, 2005 Overdue     Immunization - Pneumococcal Jul 13, 2016 Overdue Jul 13, 2015    Abdominal Aortic Aneurysm (AAA) Screening  Apr 20, 2010 Overdue     Medicare Wellness Visit Apr 20, 2010 Overdue     IMMUNIZATION - DTaP/Tdap/Td Aug 27, 2009 Overdue Aug 26, 2009    Immunization-Influenza Sep 1, 2017 Not Due Dec 16, 2015           Preventive Care for Women and Men    Heart Screenings (Cardiovascular):  · Blood tests are used to check your cholesterol, lipid and triglyceride levels. High levels can increase your risk for heart disease and stroke. High levels can be treated with medications, diet and exercise. Lowering your levels can help keep your heart and blood vessels healthy.  Your provider will order these tests if they are needed.    · An ultrasound is done to see if you have an abdominal aortic aneurysm (AAA).  This is an enlargement of one of the main blood vessels that delivers blood to the body.   In the [place], 9,000 deaths are caused by AAA.  You may not even know you have this problem and as many as 1 in 3 people will have a serious problem if it is not treated.  Early diagnosis allows for more effective treatment and cure.  If you have a family history of AAA or are a male age 65-75 who has smoked, you are at higher risk of an AAA.  Your provider can order this test, if needed.    Colorectal Screening:  · There are many tests    Problem: Chronic Conditions and Co-morbidities  Goal: Patient's chronic conditions and co-morbidity symptoms are monitored and maintained or improved  Outcome: Progressing     Problem: Discharge Planning  Goal: Discharge to home or other facility with appropriate resources  Outcome: Progressing     Problem: Pain  Goal: Verbalizes/displays adequate comfort level or baseline comfort level  Outcome: Progressing     Problem: Safety - Adult  Goal: Free from fall injury  Outcome: Progressing      that are used to check for cancer of your colon and rectum. You and your provider should discuss what test is best for you and when to have it done.  Options include:  · Screening Colonoscopy: exam of the entire colon, seen through a flexible lighted tube.  · Flexible Sigmoidoscopy: exam of the last third (sigmoid portion) of the colon and rectum, seen through a flexible lighted tube.  · Cologuard DNA stool test: a sample of your stool is used to screen for cancer and unseen blood in your stool.  · Fecal Occult Blood Test: a sample of your stool is studied to find any unseen blood    Flu Shot:  · An immunization that helps to prevent influenza (the flu). You should get this every year. The best time to get the shot is in the fall.    Pneumococcal Shot:  • Vaccines are available that can help prevent pneumococcal disease, which is any type of infection caused by Streptococcus pneumoniae bacteria.   Their use can prevent some cases of pneumonia, meningitis, and sepsis. There are two types of pneumococcal vaccines:   o Conjugate vaccines (PCV-13 or Prevnar 13®) - helps protect against the 13 types of pneumococcal bacteria that are the most common causes of serious infections in children and adults.    o Polysaccharide vaccine (PPSV23 or Xtwbquohy47®) - helps protect against 23 types of pneumococcal bacteria for patients who are recommended to get it.  These vaccines should be given at least 12 months apart.  A booster is usually not needed.     Hepatitis B Shot:  · An immunization that helps to protect people from getting Hepatitis B. Hepatitis B is a virus that spreads through contact with infected blood or body fluids. Many people with the virus do not have symptoms.  The virus can lead to serious problems, such as liver disease. Some people are at higher risk than others. Your doctor will tell you if you need this shot.     Diabetes Screening:  · A test to measure sugar (glucose) in your blood is called a fasting  blood sugar. Fasting means you cannot have food or drink for at least 8 hours before the test. This test can detect diabetes long before you may notice symptoms.    Glaucoma Screening:  · Glaucoma screening is performed by your eye doctor. The test measures the fluid pressure inside your eyes to determine if you have glaucoma.     Hepatitis C Screening:  · A blood test to see if you have the hepatitis C virus.  Hepatitis C attacks the liver and is a major cause of chronic liver disease.  Medicare will cover a single screening for all adults born between 1945 & 1965, or high risk patients (people who have injected illegal drugs or people who have had blood transfusions).  High risk patients who continue to inject illegal drugs can be screened for Hepatitis C every year.    Smoking and Tobacco-Use Cessation Counseling:  · Tobacco is the single greatest cause of disease and early death in our country today. Medication and counseling together can increase a person’s chance of quitting for good.   · Medicare covers two quitting attempts per year, with four counseling sessions per attempt (eight sessions in a 12 month period)    Preventive Screening tests for Women    Screening Mammograms and Breast Exams:  · An x-ray of your breasts to check for breast cancer before you or your doctor may be able to feel it.  If breast cancer is found early it can usually be treated with success.    Pelvic Exams and Pap Tests:  · An exam to check for cervical and vaginal cancer. A Pap test is a lab test in which cells are taken from your cervix and sent to the lab to look for signs of cervical cancer. If cancer of the cervix is found early, chances for a cure are good. Testing can generally end at age 65, or if a woman has a hysterectomy for a benign condition. Your provider may recommend more frequent testing if certain abnormal results are found.    Bone Mass Measurements:  · A painless x-ray of your bone density to see if you are at  risk for a broken bone. Bone density refers to the thickness of bones or how tightly the bone tissue is packed.    Preventive Screening tests for Men    Prostate Screening:  · PSA - Prostate Cancer blood test.  Experts do not recommend routine screening of healthy men with no signs or symptoms of prostate disease.  However, men should not ignore urinary symptoms, and should discuss their family history with their doctor.    *Medicare pays for many preventive services to keep you healthy. For some of these services, you might have to pay a deductible, coinsurance, and / or copayment.  The amounts vary depending on the type of services you need and the kind of Medicare health plan you have.                Medicare Wellness Visit  Plan for Preventive Care    A good way for you to stay healthy is to use preventive care.  Medicare covers many services that can help you stay healthy.* The goal of these services is to find any health problems as quickly as possible. Finding problems early can help make them easier to treat.  Your personal plan below lists the services you may need and when they are due.     Health Maintenance Summary     Topic Due On Due Status Completed On    Colorectal Cancer Screening - Colonoscopy Jun 7, 2021 Not Due Jun 7, 2011    Immunization-Zoster  Completed Apr 13, 2017    Immunization - Pneumococcal Jul 13, 2016 Overdue Jul 13, 2015    Abdominal Aortic Aneurysm (AAA) Screening  Apr 20, 2010 Overdue     Medicare Wellness Visit Jul 12, 2018 Not Due Jul 12, 2017    IMMUNIZATION - DTaP/Tdap/Td Aug 27, 2009 Overdue Aug 26, 2009    Immunization-Influenza Sep 1, 2017 Not Due Dec 16, 2015           Preventive Care for Women and Men    Heart Screenings (Cardiovascular):  · Blood tests are used to check your cholesterol, lipid and triglyceride levels. High levels can increase your risk for heart disease and stroke. High levels can be treated with medications, diet and exercise. Lowering your levels can  help keep your heart and blood vessels healthy.  Your provider will order these tests if they are needed.    · An ultrasound is done to see if you have an abdominal aortic aneurysm (AAA).  This is an enlargement of one of the main blood vessels that delivers blood to the body.   In the [place], 9,000 deaths are caused by AAA.  You may not even know you have this problem and as many as 1 in 3 people will have a serious problem if it is not treated.  Early diagnosis allows for more effective treatment and cure.  If you have a family history of AAA or are a male age 65-75 who has smoked, you are at higher risk of an AAA.  Your provider can order this test, if needed.    Colorectal Screening:  · There are many tests that are used to check for cancer of your colon and rectum. You and your provider should discuss what test is best for you and when to have it done.  Options include:  · Screening Colonoscopy: exam of the entire colon, seen through a flexible lighted tube.  · Flexible Sigmoidoscopy: exam of the last third (sigmoid portion) of the colon and rectum, seen through a flexible lighted tube.  · Cologuard DNA stool test: a sample of your stool is used to screen for cancer and unseen blood in your stool.  · Fecal Occult Blood Test: a sample of your stool is studied to find any unseen blood    Flu Shot:  · An immunization that helps to prevent influenza (the flu). You should get this every year. The best time to get the shot is in the fall.    Pneumococcal Shot:  • Vaccines are available that can help prevent pneumococcal disease, which is any type of infection caused by Streptococcus pneumoniae bacteria.   Their use can prevent some cases of pneumonia, meningitis, and sepsis. There are two types of pneumococcal vaccines:   o Conjugate vaccines (PCV-13 or Prevnar 13®) - helps protect against the 13 types of pneumococcal bacteria that are the most common causes of serious infections in children and adults.     o Polysaccharide vaccine (PPSV23 or Rryzkgkwl44®) - helps protect against 23 types of pneumococcal bacteria for patients who are recommended to get it.  These vaccines should be given at least 12 months apart.  A booster is usually not needed.     Hepatitis B Shot:  · An immunization that helps to protect people from getting Hepatitis B. Hepatitis B is a virus that spreads through contact with infected blood or body fluids. Many people with the virus do not have symptoms.  The virus can lead to serious problems, such as liver disease. Some people are at higher risk than others. Your doctor will tell you if you need this shot.     Diabetes Screening:  · A test to measure sugar (glucose) in your blood is called a fasting blood sugar. Fasting means you cannot have food or drink for at least 8 hours before the test. This test can detect diabetes long before you may notice symptoms.    Glaucoma Screening:  · Glaucoma screening is performed by your eye doctor. The test measures the fluid pressure inside your eyes to determine if you have glaucoma.     Hepatitis C Screening:  · A blood test to see if you have the hepatitis C virus.  Hepatitis C attacks the liver and is a major cause of chronic liver disease.  Medicare will cover a single screening for all adults born between 1945 & 1965, or high risk patients (people who have injected illegal drugs or people who have had blood transfusions).  High risk patients who continue to inject illegal drugs can be screened for Hepatitis C every year.    Smoking and Tobacco-Use Cessation Counseling:  · Tobacco is the single greatest cause of disease and early death in our country today. Medication and counseling together can increase a person’s chance of quitting for good.   · Medicare covers two quitting attempts per year, with four counseling sessions per attempt (eight sessions in a 12 month period)    Preventive Screening tests for Women    Screening Mammograms and Breast  Exams:  · An x-ray of your breasts to check for breast cancer before you or your doctor may be able to feel it.  If breast cancer is found early it can usually be treated with success.    Pelvic Exams and Pap Tests:  · An exam to check for cervical and vaginal cancer. A Pap test is a lab test in which cells are taken from your cervix and sent to the lab to look for signs of cervical cancer. If cancer of the cervix is found early, chances for a cure are good. Testing can generally end at age 65, or if a woman has a hysterectomy for a benign condition. Your provider may recommend more frequent testing if certain abnormal results are found.    Bone Mass Measurements:  · A painless x-ray of your bone density to see if you are at risk for a broken bone. Bone density refers to the thickness of bones or how tightly the bone tissue is packed.    Preventive Screening tests for Men    Prostate Screening:  · PSA - Prostate Cancer blood test.  Experts do not recommend routine screening of healthy men with no signs or symptoms of prostate disease.  However, men should not ignore urinary symptoms, and should discuss their family history with their doctor.    *Medicare pays for many preventive services to keep you healthy. For some of these services, you might have to pay a deductible, coinsurance, and / or copayment.  The amounts vary depending on the type of services you need and the kind of Medicare health plan you have.

## 2025-03-19 NOTE — PROGRESS NOTES
[facility] - Inpatient Rehabilitation Department   Phone: [phone]    Physical Therapy    [] Initial Evaluation            [x] Daily Treatment Note         [] Discharge Summary      Patient: Duy Gant   : 1955   MRN: [id number]   Date of Service:  3/19/2025  Admitting Diagnosis: Community acquired bacterial pneumonia  Current Admission Summary: Per ED H&P: \"Duy Gant is a 70 y.o. male who presents to the emergency department with complaint of shortness of breath and productive cough over the past 1 week, worse over the past 1 or 2 days...  Reports that he was hypoxic initially at 83% on room air, no history of supplemental oxygen.   Patient is ill-appearing, conversationally dyspneic.  Productive cough present.\"   - Dx: Flu+, pneumonia  Past Medical History:  has a past medical history of Gout, Hyperlipidemia, Hypertension, Osteomyelitis, ankle and foot (HCC), and Unspecified cerebral artery occlusion with cerebral infarction.  Past Surgical History:  has no past surgical history on file.  Discharge Recommendations: Duy Gant scored a 20/24 on the AM-PAC short mobility form. Current research shows that an AM-PAC score of 18 or greater is typically associated with a discharge to the patient's home setting. Based on the patient's AM-PAC score and their current functional mobility deficits, it is recommended that the patient have 2-3 sessions per week of Physical Therapy at d/c to increase the patient's independence.  At this time, this patient demonstrates the endurance and safety to discharge home without formal physical therapy services.  Please see assessment section for further patient specific details.    If patient discharges prior to next session this note will serve as a discharge summary.  Please see below for the latest assessment towards goals.      DME Required For Discharge: no DME required at discharge  Precautions/Restrictions: high fall risk, up as tolerated  Weight  good  Clinical Assessment: Mr. Duy Gant is a 70 year old man admitted to the hospital with shortness of breath 3/16 diagnosed with flu and pneumonia. Pt is limited by the above deficits and would benefit from skilled PT services to maximize pt functional mobility and safety prior to discharge.     Safety Interventions: patient left in chair, chair alarm in place, call light within reach, gait belt, patient at risk for falls, and nurse notified    Plan  Frequency: 3-5 x/per week  Current Treatment Recommendations: strengthening, balance training, functional mobility training, transfer training, gait training, stair training, endurance training, neuromuscular re-education, patient/caregiver education, home exercise program, and safety education    Goals  Patient Goals: to return home as soon as possible   Short Term Goals:  Time Frame: Upon Discharge  Patient will complete transfers at [facility]   Patient will ambulate 100 ft with use of LRAD at [facility]  Patient will ascend/descend 1 stairs without use of HR at [facility]  Patient will complete car transfer at modified independent    Above goals reviewed on 3/19/2025.  All goals are ongoing at this time unless indicated above.      Therapy Session Time      Individual Group Co-treatment   Time In 1006       Time Out 1030       Minutes 24         Timed Code Treatment Minutes:  24 Minutes  Total Treatment Minutes:  24 Minutes       Electronically Signed By: Vanessa Escobar, PT    Thanks, Vanessa Escobar PT, DPT #[id number] 3/19/2025

## 2025-03-19 NOTE — PROGRESS NOTES
03/19/25 0800   RT Protocol   History Pulmonary Disease 2   Respiratory pattern 2   Breath sounds 2   Cough 0   Bronchodilator Assessment Score 6

## 2025-03-19 NOTE — PROGRESS NOTES
Hospitalist Progress Note      Name:  Duy Gant /Age/Sex: 1955  (70 y.o. male)   MRN & CSN:  [id number] & [id number] Encounter Date/Time: 3/19/2025 7:13 AM EDT   Location:  [id number] PCP: Pino Lundberg MD     Attending:Patrice Walden MD       Hospital Day: 4    Subjective:   Chief Complaint:   Chief Complaint   Patient presents with    Shortness of Breath     Patient arrived by [facility] with SOB x 1 week. Worse in the last 1-2 days. Two Duoneb given in route and 60 mg Solumedrol in route by EMS. Initial oxygen 83% in route. Arrived on [facility].      Duy Gant is a 70 y.o. male with a past medical history of hypertension, hyperlipidemia, OM, CVA, gout who presented with SOB x 1 week. Positive for influenza A and has been admitted for acute hypoxic respiratory failure and treated for CAP.     Interval History:  Today, he is resting in bed. Wheezing.  Cough and left chest wall pain continues.  No hypoxia.  He is feeling in better spirits today.      Independently reviewed interval ancillary notes from emergency medicine.     Assessment and Recommendations   Problem List  Principal Problem:    Community acquired bacterial pneumonia  Active Problems:    Hypertension    Hypercholesterolemia    Gout  Resolved Problems:    * No resolved hospital problems. *     Assessment and Plan:    Acute hypoxic respiratory failure   Influenza A  CAP   - in the setting of community-acquired bacterial pneumonia with underlying influenza A infection   - CXR basilar atelectasis vs PNA, CTPE showed no PE and multifocal PNA  - Continue Tamiflu 75 mg bid x 5 days   - Continue IV Azithromycin and ceftriaxone   - Continue DuoNeb   - Add Mucinex and Robitussin scheduled, tessalon prn  - Keep off IVF  Hypertension  - Continue  amlodipine, metoprolol, lisinopril   - BP is controlled  Hyperlipidemia   - Continue pravastatin  History of gout   - on indomethacin allopurinol colchicine at home currently no signs of flareup of

## 2025-03-19 NOTE — PROGRESS NOTES
[facility]    3/20/2025 10:47 AM        Name: Duy Gant .              Admitted: 3/16/2025  Primary Care Provider: Pino Lundberg MD (Tel: [phone])      Chief complaint: 69 yo male presented with shortness of breath. Admitted with influenza A, acute hypoxic respiratory failure, and CAP.    Subjective:  Up in bedside chair. Says he is tired today, did not sleep last night, cough kept him awake. Breathing improved but still easily winded, even with talking. Denies chest pain, abdominal pain, nausea.     Reviewed interval ancillary notes    Current Medications  ipratropium 0.5 mg-albuterol 2.5 mg (DUONEB) nebulizer solution 1 Dose, BID RT  guaiFENesin-dextromethorphan (ROBITUSSIN DM) 100-10 MG/5ML syrup 5 mL, Q4H  benzonatate (TESSALON) capsule 100 mg, TID PRN  oseltamivir (TAMIFLU) capsule 75 mg, BID  guaiFENesin (MUCINEX) extended release tablet 600 mg, BID  HYDROcodone-acetaminophen (NORCO) 5-325 MG per tablet 1 tablet, Q6H PRN  amLODIPine (NORVASC) tablet 10 mg, Daily  pravastatin (PRAVACHOL) tablet 40 mg, QPM  metoprolol tartrate (LOPRESSOR) tablet 100 mg, BID  sodium chloride flush 0.9 % injection 5-40 mL, 2 times per day  sodium chloride flush 0.9 % injection 5-40 mL, PRN  0.9 % sodium chloride infusion, PRN  potassium chloride (KLOR-CON M) extended release tablet 40 mEq, PRN   Or  potassium bicarb-citric acid (EFFER-K) effervescent tablet 40 mEq, PRN   Or  potassium chloride 10 mEq/100 mL IVPB (Peripheral Line), PRN  magnesium sulfate 2000 mg in 50 mL IVPB premix, PRN  enoxaparin (LOVENOX) injection 40 mg, Daily  ondansetron (ZOFRAN-ODT) disintegrating tablet 4 mg, Q8H PRN   Or  ondansetron (ZOFRAN) injection 4 mg, Q6H PRN  polyethylene glycol (GLYCOLAX) packet 17 g, Daily PRN  acetaminophen (TYLENOL) tablet 650 mg, Q6H PRN   Or  acetaminophen (TYLENOL) suppository 650 mg, Q6H PRN  albuterol

## 2025-03-20 LAB
GLUCOSE BLD-MCNC: 102 MG/DL (ref 70–99)
GLUCOSE BLD-MCNC: 124 MG/DL (ref 70–99)
GLUCOSE BLD-MCNC: 125 MG/DL (ref 70–99)
GLUCOSE BLD-MCNC: 94 MG/DL (ref 70–99)
PERFORMED ON: ABNORMAL
PERFORMED ON: NORMAL

## 2025-03-20 PROCEDURE — 94640 AIRWAY INHALATION TREATMENT: CPT

## 2025-03-20 PROCEDURE — 6370000000 HC RX 637 (ALT 250 FOR IP): Performed by: NURSE PRACTITIONER

## 2025-03-20 PROCEDURE — 1200000000 HC SEMI PRIVATE

## 2025-03-20 PROCEDURE — 2500000003 HC RX 250 WO HCPCS: Performed by: STUDENT IN AN ORGANIZED HEALTH CARE EDUCATION/TRAINING PROGRAM

## 2025-03-20 PROCEDURE — 87449 NOS EACH ORGANISM AG IA: CPT

## 2025-03-20 PROCEDURE — 6370000000 HC RX 637 (ALT 250 FOR IP): Performed by: INTERNAL MEDICINE

## 2025-03-20 PROCEDURE — 6370000000 HC RX 637 (ALT 250 FOR IP): Performed by: STUDENT IN AN ORGANIZED HEALTH CARE EDUCATION/TRAINING PROGRAM

## 2025-03-20 PROCEDURE — 94669 MECHANICAL CHEST WALL OSCILL: CPT

## 2025-03-20 PROCEDURE — 97116 GAIT TRAINING THERAPY: CPT

## 2025-03-20 PROCEDURE — 6360000002 HC RX W HCPCS: Performed by: STUDENT IN AN ORGANIZED HEALTH CARE EDUCATION/TRAINING PROGRAM

## 2025-03-20 RX ORDER — CODEINE PHOSPHATE AND GUAIFENESIN 10; 100 MG/5ML; MG/5ML
5 SOLUTION ORAL EVERY 4 HOURS PRN
Status: DISCONTINUED | OUTPATIENT
Start: 2025-03-20 | End: 2025-03-21

## 2025-03-20 RX ADMIN — METOPROLOL TARTRATE 100 MG: 50 TABLET, FILM COATED ORAL at 08:16

## 2025-03-20 RX ADMIN — WATER 1000 MG: 1 INJECTION INTRAMUSCULAR; INTRAVENOUS; SUBCUTANEOUS at 23:08

## 2025-03-20 RX ADMIN — PRAVASTATIN SODIUM 40 MG: 40 TABLET ORAL at 18:42

## 2025-03-20 RX ADMIN — Medication 10 ML: at 08:18

## 2025-03-20 RX ADMIN — IPRATROPIUM BROMIDE AND ALBUTEROL SULFATE 1 DOSE: .5; 3 SOLUTION RESPIRATORY (INHALATION) at 08:52

## 2025-03-20 RX ADMIN — LISINOPRIL 10 MG: 10 TABLET ORAL at 08:16

## 2025-03-20 RX ADMIN — METOPROLOL TARTRATE 100 MG: 50 TABLET, FILM COATED ORAL at 19:49

## 2025-03-20 RX ADMIN — OSELTAMIVIR PHOSPHATE 75 MG: 75 CAPSULE ORAL at 19:49

## 2025-03-20 RX ADMIN — IPRATROPIUM BROMIDE AND ALBUTEROL SULFATE 1 DOSE: .5; 3 SOLUTION RESPIRATORY (INHALATION) at 21:32

## 2025-03-20 RX ADMIN — ALLOPURINOL 100 MG: 100 TABLET ORAL at 08:17

## 2025-03-20 RX ADMIN — ENOXAPARIN SODIUM 40 MG: 100 INJECTION SUBCUTANEOUS at 08:18

## 2025-03-20 RX ADMIN — Medication 10 ML: at 19:49

## 2025-03-20 RX ADMIN — GUAIFENESIN AND DEXTROMETHORPHAN 5 ML: 100; 10 SYRUP ORAL at 04:25

## 2025-03-20 RX ADMIN — AMLODIPINE BESYLATE 10 MG: 5 TABLET ORAL at 08:16

## 2025-03-20 RX ADMIN — GUAIFENESIN 600 MG: 600 TABLET, EXTENDED RELEASE ORAL at 19:49

## 2025-03-20 RX ADMIN — GUAIFENESIN AND DEXTROMETHORPHAN 5 ML: 100; 10 SYRUP ORAL at 08:16

## 2025-03-20 RX ADMIN — GUAIFENESIN 600 MG: 600 TABLET, EXTENDED RELEASE ORAL at 08:16

## 2025-03-20 RX ADMIN — OSELTAMIVIR PHOSPHATE 75 MG: 75 CAPSULE ORAL at 08:16

## 2025-03-20 NOTE — PLAN OF CARE
Problem: Chronic Conditions and Co-morbidities  Goal: Patient's chronic conditions and co-morbidity symptoms are monitored and maintained or improved  3/20/2025 1117 by Robyn Garcia RN  Outcome: Progressing  3/19/2025 2142 by Erin Sampson RN  Outcome: Progressing     Problem: Discharge Planning  Goal: Discharge to home or other facility with appropriate resources  3/20/2025 1117 by Robyn Garcia RN  Outcome: Progressing  3/19/2025 2142 by Erin Sampson RN  Outcome: Progressing     Problem: Pain  Goal: Verbalizes/displays adequate comfort level or baseline comfort level  3/20/2025 1117 by Robyn Garcia RN  Outcome: Progressing  3/19/2025 2142 by Erin Sampson RN  Outcome: Progressing     Problem: Safety - Adult  Goal: Free from fall injury  3/20/2025 1117 by Robyn Garcia RN  Outcome: Progressing  3/19/2025 2142 by Erin Sampson RN  Outcome: Progressing     Problem: Respiratory - Adult  Goal: Achieves optimal ventilation and oxygenation  Outcome: Progressing     Problem: Musculoskeletal - Adult  Goal: Return mobility to safest level of function  Outcome: Progressing  Goal: Maintain proper alignment of affected body part  Outcome: Progressing  Goal: Return ADL status to a safe level of function  Outcome: Progressing     Problem: Infection - Adult  Goal: Absence of infection at discharge  Outcome: Progressing  Goal: Absence of infection during hospitalization  Outcome: Progressing  Goal: Absence of fever/infection during anticipated neutropenic period  Outcome: Progressing

## 2025-03-20 NOTE — PROGRESS NOTES
Shift assessment completed. Routine vitals obtained. Scheduled medications given. Patient is awake, alert and oriented. Pt c/o SOB and chest pain. 02 90% on RA. HOB elevated. Call light within reach.

## 2025-03-20 NOTE — PROGRESS NOTES
recommendations  Learning Assessment:  patient verbalizes understanding, would benefit from continued reinforcement    Assessment  Activity Tolerance: Fair - dyspneic on exertion though refuses to get out of bed due to fear of being unstable.   Impairments Requiring Therapeutic Intervention: decreased functional mobility, decreased safety awareness, decreased endurance, decreased balance, decreased vision, decreased posture  Prognosis: good  Clinical Assessment: Mr. Duy Gant is a 70 year old man admitted to the hospital with shortness of breath 3/16 diagnosed with flu and pneumonia. Pt is limited by the above deficits and would benefit from skilled PT services to maximize pt functional mobility and safety prior to discharge.     Safety Interventions: patient left in chair, chair alarm in place, call light within reach, gait belt, patient at risk for falls, and nurse notified    Plan  Frequency: 3-5 x/per week  Current Treatment Recommendations: strengthening, balance training, functional mobility training, transfer training, gait training, stair training, endurance training, neuromuscular re-education, patient/caregiver education, home exercise program, and safety education    Goals  Patient Goals: to return home as soon as possible   Short Term Goals:  Time Frame: Upon Discharge  Patient will complete transfers at [facility]   Patient will ambulate 100 ft with use of LRAD at [facility]  Patient will ascend/descend 1 stairs without use of HR at [facility]  Patient will complete car transfer at modified independent    Above goals reviewed on 3/20/2025.  All goals are ongoing at this time unless indicated above.      Therapy Session Time      Individual Group Co-treatment   Time In 0942       Time Out 1010       Minutes 28         Timed Code Treatment Minutes:  28 Minutes  Total Treatment Minutes:  28 Minutes       Electronically Signed By: Vanessa Escobar, PT    ThanksVanessa  Shawn PT, DPT #[id number] 3/20/2025

## 2025-03-20 NOTE — PLAN OF CARE
Problem: Chronic Conditions and Co-morbidities  Goal: Patient's chronic conditions and co-morbidity symptoms are monitored and maintained or improved  3/19/2025 2142 by Erin Sampson RN  Outcome: Progressing  3/19/2025 1149 by Glenn Persaud RN  Outcome: Progressing     Problem: Discharge Planning  Goal: Discharge to home or other facility with appropriate resources  3/19/2025 2142 by Erin Sampson RN  Outcome: Progressing  3/19/2025 1149 by Glenn Persaud RN  Outcome: Progressing     Problem: Pain  Goal: Verbalizes/displays adequate comfort level or baseline comfort level  3/19/2025 2142 by Erin Sampson RN  Outcome: Progressing  3/19/2025 1149 by Glenn Persaud RN  Outcome: Progressing     Problem: Safety - Adult  Goal: Free from fall injury  3/19/2025 2142 by Erin Sampson RN  Outcome: Progressing  3/19/2025 1149 by Glenn Persaud RN  Outcome: Progressing

## 2025-03-21 ENCOUNTER — APPOINTMENT (OUTPATIENT)
Dept: GENERAL RADIOLOGY | Age: 70
End: 2025-03-21
Payer: MEDICARE

## 2025-03-21 VITALS
HEART RATE: 66 BPM | RESPIRATION RATE: 16 BRPM | HEIGHT: 72 IN | BODY MASS INDEX: 29.83 KG/M2 | TEMPERATURE: 98 F | WEIGHT: 220.24 LBS | DIASTOLIC BLOOD PRESSURE: 80 MMHG | SYSTOLIC BLOOD PRESSURE: 140 MMHG | OXYGEN SATURATION: 92 %

## 2025-03-21 LAB
ANION GAP SERPL CALCULATED.3IONS-SCNC: 13 MMOL/L (ref 3–16)
BACTERIA BLD CULT ORG #2: NORMAL
BACTERIA BLD CULT: NORMAL
BUN SERPL-MCNC: 18 MG/DL (ref 7–20)
CALCIUM SERPL-MCNC: 9 MG/DL (ref 8.3–10.6)
CHLORIDE SERPL-SCNC: 102 MMOL/L (ref 99–110)
CO2 SERPL-SCNC: 22 MMOL/L (ref 21–32)
CREAT SERPL-MCNC: 1 MG/DL (ref 0.8–1.3)
DEPRECATED RDW RBC AUTO: 13 % (ref 12.4–15.4)
GFR SERPLBLD CREATININE-BSD FMLA CKD-EPI: 80 ML/MIN/{1.73_M2}
GLUCOSE BLD-MCNC: 82 MG/DL (ref 70–99)
GLUCOSE BLD-MCNC: 86 MG/DL (ref 70–99)
GLUCOSE SERPL-MCNC: 91 MG/DL (ref 70–99)
HCT VFR BLD AUTO: 42.7 % (ref 40.5–52.5)
HGB BLD-MCNC: 14.6 G/DL (ref 13.5–17.5)
LEGIONELLA AG UR QL: NORMAL
MCH RBC QN AUTO: 31.2 PG (ref 26–34)
MCHC RBC AUTO-ENTMCNC: 34.2 G/DL (ref 31–36)
MCV RBC AUTO: 91.2 FL (ref 80–100)
PERFORMED ON: NORMAL
PERFORMED ON: NORMAL
PLATELET # BLD AUTO: 439 K/UL (ref 135–450)
PMV BLD AUTO: 8.4 FL (ref 5–10.5)
POTASSIUM SERPL-SCNC: 3.7 MMOL/L (ref 3.5–5.1)
RBC # BLD AUTO: 4.68 M/UL (ref 4.2–5.9)
S PNEUM AG UR QL: NORMAL
SODIUM SERPL-SCNC: 137 MMOL/L (ref 136–145)
WBC # BLD AUTO: 14.2 K/UL (ref 4–11)

## 2025-03-21 PROCEDURE — 94761 N-INVAS EAR/PLS OXIMETRY MLT: CPT

## 2025-03-21 PROCEDURE — 6370000000 HC RX 637 (ALT 250 FOR IP): Performed by: INTERNAL MEDICINE

## 2025-03-21 PROCEDURE — 94680 O2 UPTK RST&XERS DIR SIMPLE: CPT

## 2025-03-21 PROCEDURE — 6370000000 HC RX 637 (ALT 250 FOR IP): Performed by: STUDENT IN AN ORGANIZED HEALTH CARE EDUCATION/TRAINING PROGRAM

## 2025-03-21 PROCEDURE — 6360000002 HC RX W HCPCS: Performed by: STUDENT IN AN ORGANIZED HEALTH CARE EDUCATION/TRAINING PROGRAM

## 2025-03-21 PROCEDURE — 2700000000 HC OXYGEN THERAPY PER DAY

## 2025-03-21 PROCEDURE — 36415 COLL VENOUS BLD VENIPUNCTURE: CPT

## 2025-03-21 PROCEDURE — 6370000000 HC RX 637 (ALT 250 FOR IP): Performed by: NURSE PRACTITIONER

## 2025-03-21 PROCEDURE — 94669 MECHANICAL CHEST WALL OSCILL: CPT

## 2025-03-21 PROCEDURE — 71046 X-RAY EXAM CHEST 2 VIEWS: CPT

## 2025-03-21 PROCEDURE — 99223 1ST HOSP IP/OBS HIGH 75: CPT | Performed by: INTERNAL MEDICINE

## 2025-03-21 PROCEDURE — 85027 COMPLETE CBC AUTOMATED: CPT

## 2025-03-21 PROCEDURE — 80048 BASIC METABOLIC PNL TOTAL CA: CPT

## 2025-03-21 PROCEDURE — 94640 AIRWAY INHALATION TREATMENT: CPT

## 2025-03-21 PROCEDURE — 2500000003 HC RX 250 WO HCPCS: Performed by: STUDENT IN AN ORGANIZED HEALTH CARE EDUCATION/TRAINING PROGRAM

## 2025-03-21 RX ORDER — BUDESONIDE AND FORMOTEROL FUMARATE DIHYDRATE 160; 4.5 UG/1; UG/1
2 AEROSOL RESPIRATORY (INHALATION) 2 TIMES DAILY
Qty: 2 EACH | Refills: 0 | Status: SHIPPED | OUTPATIENT
Start: 2025-03-21 | End: 2025-04-20

## 2025-03-21 RX ORDER — SELENIUM 50 MCG
1 TABLET ORAL 2 TIMES DAILY
Qty: 14 CAPSULE | Refills: 0 | Status: SHIPPED | OUTPATIENT
Start: 2025-03-21 | End: 2025-03-28

## 2025-03-21 RX ORDER — OSELTAMIVIR PHOSPHATE 75 MG/1
75 CAPSULE ORAL 2 TIMES DAILY
Qty: 2 CAPSULE | Refills: 0 | Status: SHIPPED | OUTPATIENT
Start: 2025-03-21 | End: 2025-03-22

## 2025-03-21 RX ORDER — GUAIFENESIN/DEXTROMETHORPHAN 100-10MG/5
5 SYRUP ORAL EVERY 8 HOURS
Qty: 75 ML | Refills: 0 | Status: SHIPPED | OUTPATIENT
Start: 2025-03-21 | End: 2025-03-26

## 2025-03-21 RX ORDER — ALBUTEROL SULFATE 90 UG/1
2 INHALANT RESPIRATORY (INHALATION) EVERY 4 HOURS PRN
Qty: 18 G | Refills: 1 | Status: SHIPPED | OUTPATIENT
Start: 2025-03-21

## 2025-03-21 RX ORDER — GUAIFENESIN/DEXTROMETHORPHAN 100-10MG/5
5 SYRUP ORAL EVERY 8 HOURS
Status: DISCONTINUED | OUTPATIENT
Start: 2025-03-21 | End: 2025-03-21 | Stop reason: HOSPADM

## 2025-03-21 RX ORDER — LEVOFLOXACIN 750 MG/1
750 TABLET, FILM COATED ORAL DAILY
Qty: 5 TABLET | Refills: 0 | Status: SHIPPED | OUTPATIENT
Start: 2025-03-21 | End: 2025-03-26

## 2025-03-21 RX ADMIN — LISINOPRIL 10 MG: 10 TABLET ORAL at 08:06

## 2025-03-21 RX ADMIN — Medication 10 ML: at 08:10

## 2025-03-21 RX ADMIN — PRAVASTATIN SODIUM 40 MG: 40 TABLET ORAL at 17:39

## 2025-03-21 RX ADMIN — METOPROLOL TARTRATE 100 MG: 50 TABLET, FILM COATED ORAL at 08:06

## 2025-03-21 RX ADMIN — GUAIFENESIN AND DEXTROMETHORPHAN 5 ML: 100; 10 SYRUP ORAL at 13:44

## 2025-03-21 RX ADMIN — ALLOPURINOL 100 MG: 100 TABLET ORAL at 08:06

## 2025-03-21 RX ADMIN — AMLODIPINE BESYLATE 10 MG: 5 TABLET ORAL at 08:06

## 2025-03-21 RX ADMIN — ENOXAPARIN SODIUM 40 MG: 100 INJECTION SUBCUTANEOUS at 08:10

## 2025-03-21 RX ADMIN — GUAIFENESIN 600 MG: 600 TABLET, EXTENDED RELEASE ORAL at 08:06

## 2025-03-21 RX ADMIN — IPRATROPIUM BROMIDE AND ALBUTEROL SULFATE 1 DOSE: .5; 3 SOLUTION RESPIRATORY (INHALATION) at 08:38

## 2025-03-21 RX ADMIN — OSELTAMIVIR PHOSPHATE 75 MG: 75 CAPSULE ORAL at 08:06

## 2025-03-21 ASSESSMENT — PAIN SCALES - GENERAL: PAINLEVEL_OUTOF10: 3

## 2025-03-21 ASSESSMENT — PAIN DESCRIPTION - ORIENTATION: ORIENTATION: LEFT

## 2025-03-21 ASSESSMENT — PAIN DESCRIPTION - DESCRIPTORS: DESCRIPTORS: SHARP;OTHER (COMMENT)

## 2025-03-21 NOTE — DISCHARGE SUMMARY
[facility] DISCHARGE SUMMARY    Patient Demographics    Patient. Duy Gant  Date of Birth. 1955  MRN. [id number]     Primary care provider. Pino Lundberg MD  (Tel: [phone])    Admit date: 3/16/2025    Discharge date (blank if same as Note Date):   Note Date: 3/21/2025     Reason for Hospitalization.   Chief Complaint   Patient presents with    Shortness of Breath     Patient arrived by [facility] with SOB x 1 week. Worse in the last 1-2 days. Two Duoneb given in route and 60 mg Solumedrol in route by EMS. Initial oxygen 83% in route. Arrived on [facility].          Significant Findings.   Principal Problem:    Community acquired bacterial pneumonia  Active Problems:    Hypertension    Hypercholesterolemia    Gout  Resolved Problems:    * No resolved hospital problems. *       Problems and results from this hospitalization that need follow up.  Multifocal pneumonia. Recommend repeat CXR in 6-8 weeks to ensure clearing.    Significant test results and incidental findings.  CT CHEST PULMONARY EMBOLISM W CONTRAST   Final Result   1. Motion degraded exam. No evidence for central pulmonary embolism.   2. Bilateral patchy consolidative opacities are noted. Some of these   opacities appear partially cavitary. Findings are concerning for multifocal   pneumonia.   3. Findings most consistent with left substernal goiter extending into the   left paratracheal region.           XR CHEST PORTABLE   Final Result   Right basilar airspace opacity, atelectasis versus pneumonia.     CXR 3/21/2025:  IMPRESSION:  1. Cavitary airspace opacities seen on prior CT are not well demonstrated on  x-ray.  2. Consolidative changes along the left major fissure and lingula are similar  to the prior exam given differences in modality.  Findings are compatible  with multifocal pneumonia.    Invasive procedures and treatments.   None  [place] - [address] [facility] - P [phone] - F [phone] [address] [facility] [address]      Phone: [phone]   acidophilus Caps capsule  albuterol sulfate  (90 Base) MCG/ACT inhaler  budesonide-formoterol 160-4.5 MCG/ACT Aero  guaiFENesin-dextromethorphan 100-10 MG/5ML syrup  levoFLOXacin 750 MG tablet  oseltamivir 75 MG capsule         Discharge recommendations given to patient.  Follow Up. PCP in 1 week   Disposition.  home  Activity. activity as tolerated, declined home health  Diet: ADULT DIET; Regular; 5 carb choices (75 gm/meal)      Spent 40 minutes in discharge process.    Signed:  NANCY Valdez CNP     3/21/2025 4:52 PM

## 2025-03-21 NOTE — PROGRESS NOTES
[facility]    3/21/2025 11:44 AM        Name: Duy Gant .              Admitted: 3/16/2025  Primary Care Provider: Pino Lundberg MD (Tel: [phone])      Chief complaint: 69 yo male presented with shortness of breath. Admitted with influenza A, acute hypoxic respiratory failure, and CAP.    Subjective:  Resting in bed. Says he feels about the same. Notes shortness of breath with activity. Now requiring O2. Reports cough, productive at times. Denies chest pain. Remains afebrile, WBC trending down.     Reviewed interval ancillary notes    Current Medications  guaiFENesin-codeine (guaiFENesin AC) 100-10 MG/5ML liquid 5 mL, Q4H PRN  melatonin tablet 3 mg, Nightly PRN  ipratropium 0.5 mg-albuterol 2.5 mg (DUONEB) nebulizer solution 1 Dose, BID RT  benzonatate (TESSALON) capsule 100 mg, TID PRN  oseltamivir (TAMIFLU) capsule 75 mg, BID  guaiFENesin (MUCINEX) extended release tablet 600 mg, BID  HYDROcodone-acetaminophen (NORCO) 5-325 MG per tablet 1 tablet, Q6H PRN  amLODIPine (NORVASC) tablet 10 mg, Daily  pravastatin (PRAVACHOL) tablet 40 mg, QPM  metoprolol tartrate (LOPRESSOR) tablet 100 mg, BID  sodium chloride flush 0.9 % injection 5-40 mL, 2 times per day  sodium chloride flush 0.9 % injection 5-40 mL, PRN  0.9 % sodium chloride infusion, PRN  potassium chloride (KLOR-CON M) extended release tablet 40 mEq, PRN   Or  potassium bicarb-citric acid (EFFER-K) effervescent tablet 40 mEq, PRN   Or  potassium chloride 10 mEq/100 mL IVPB (Peripheral Line), PRN  magnesium sulfate 2000 mg in 50 mL IVPB premix, PRN  enoxaparin (LOVENOX) injection 40 mg, Daily  ondansetron (ZOFRAN-ODT) disintegrating tablet 4 mg, Q8H PRN   Or  ondansetron (ZOFRAN) injection 4 mg, Q6H PRN  polyethylene glycol (GLYCOLAX) packet 17 g, Daily PRN  acetaminophen (TYLENOL) tablet 650 mg, Q6H PRN   Or  acetaminophen (TYLENOL) suppository 650

## 2025-03-21 NOTE — PROGRESS NOTES
Physician Progress Note      PATIENT:               IBARHIMA SPICER  CSN #:                  [id number]  :                       1955  ADMIT DATE:       3/16/2025 11:07 PM  DISCH DATE:  RESPONDING  PROVIDER #:        YOSSI Mixon CNP          QUERY TEXT:    Pt admitted with Influenza A with bacterial PNA.  Noted documentation of   \"Severe sepsis\"-by ED MD on 3/16. If possible, please document in progress   notes and discharge summary:    The medical record reflects the following:  Risk Factors: Influenza A, Bacterial PNA  Clinical Indicators: 3/16- WBC=22.2 (before solumedrol), Lactic acid=4.8,   HR>90 sustained, H/P \"Acute hypoxic respiratory failure \"; ED MD \"presents   with shortness of breath and productive cough for the last week or so but is   worsened over the last 24-48 hours. Underwent septic workup including lactic   acid and blood cultures.  Source of pneumonia. \" H/P \"Respiratory distress   present. Weakness present.\"  Treatment: CBC, CMP, CXR, Pan cultures, Meds- 1L NS IV bolus, IV Zithromax, IV   Cefepime, IV Rocephin, Solu-Medrol, Vancomycin, O2 therapy protocol  V/S and   I/O monitoring per unit protocol  Options provided:  -- Severe sepsis d/t PNA and Influenza, POA  -- Severe sepsis d/t PNA and Influenza ruled out  -- Other - I will add my own diagnosis  -- Disagree - Not applicable / Not valid  -- Disagree - Clinically unable to determine / Unknown  -- Refer to Clinical Documentation Reviewer    PROVIDER RESPONSE TEXT:    Severe sepsis d/t PNA and Influenza, POA    Query created by: Monie Sanchez on 3/20/2025 3:32 PM      Electronically signed by:  YOSSI Mixon CNP 3/21/2025 1:16 PM

## 2025-03-21 NOTE — PROGRESS NOTES
Physical Therapy  Duy Gant      Patient declines physical therapy intervention this date due to fatigue and return to needing supplemental O2. Will resume plan of care as patient allows.     Thanks, Vanessa Escobar PT, DPT #[id number] 3/21/2025

## 2025-03-21 NOTE — PROGRESS NOTES
03/21/25 1634   Resting (Room Air)   SpO2 92   HR 67   During Walk (Room Air)   SpO2 90   HR 80   Rate of Dyspnea 0   After Walk   SpO2 92   HR 70   Rate of Dyspnea 0   Does the Patient Qualify for Home O2 No   Does the Patient Need Portable Oxygen Tanks No

## 2025-03-21 NOTE — PLAN OF CARE
Problem: Chronic Conditions and Co-morbidities  Goal: Patient's chronic conditions and co-morbidity symptoms are monitored and maintained or improved  3/21/2025 1725 by Halima Zacarias RN  Outcome: Completed  3/21/2025 0849 by Halima Zacarias RN  Outcome: Progressing     Problem: Discharge Planning  Goal: Discharge to home or other facility with appropriate resources  3/21/2025 1725 by Halima Zacarias RN  Outcome: Completed  3/21/2025 0849 by Halima Zacarias RN  Outcome: Progressing     Problem: Pain  Goal: Verbalizes/displays adequate comfort level or baseline comfort level  3/21/2025 1725 by Halima Zacarias RN  Outcome: Completed  3/21/2025 0849 by Halima Zacarias RN  Outcome: Progressing     Problem: Safety - Adult  Goal: Free from fall injury  3/21/2025 1725 by Halima Zacarias RN  Outcome: Completed  3/21/2025 0849 by Halima Zacarias RN  Outcome: Progressing     Problem: Respiratory - Adult  Goal: Achieves optimal ventilation and oxygenation  3/21/2025 1725 by Halima Zacarias RN  Outcome: Completed  3/21/2025 0849 by Halima Zacarias RN  Outcome: Progressing     Problem: Musculoskeletal - Adult  Goal: Return mobility to safest level of function  3/21/2025 1725 by Halima Zacarias RN  Outcome: Completed  3/21/2025 0849 by Halima Zacarias RN  Outcome: Progressing  Goal: Maintain proper alignment of affected body part  3/21/2025 1725 by Halima Zacarias RN  Outcome: Completed  3/21/2025 0849 by Halima Zacarias RN  Outcome: Progressing  Goal: Return ADL status to a safe level of function  3/21/2025 1725 by Halima Zacarias RN  Outcome: Completed  3/21/2025 0849 by Halima Zacarias RN  Outcome: Progressing     Problem: Infection - Adult  Goal: Absence of infection at discharge  3/21/2025 1725 by Halima Zacarias RN  Outcome: Completed  3/21/2025 0849 by Halima Zacarias RN  Outcome: Progressing  Goal: Absence of infection during hospitalization  3/21/2025 1725 by Halima Zacarias RN  Outcome: Completed  3/21/2025 0849 by Zacarias, Halima, RN  Outcome:  Progressing  Goal: Absence of fever/infection during anticipated neutropenic period  3/21/2025 1725 by Halima Zacarias, RN  Outcome: Completed  3/21/2025 0849 by Halima Zacarias RN  Outcome: Progressing     Problem: ABCDS Injury Assessment  Goal: Absence of physical injury  3/21/2025 1725 by Halima Zacarias, RN  Outcome: Completed  3/21/2025 0849 by Halima Zacarias, RN  Outcome: Progressing

## 2025-03-21 NOTE — PLAN OF CARE
Problem: Chronic Conditions and Co-morbidities  Goal: Patient's chronic conditions and co-morbidity symptoms are monitored and maintained or improved  Outcome: Progressing     Problem: Discharge Planning  Goal: Discharge to home or other facility with appropriate resources  Outcome: Progressing     Problem: Pain  Goal: Verbalizes/displays adequate comfort level or baseline comfort level  Outcome: Progressing     Problem: Safety - Adult  Goal: Free from fall injury  Outcome: Progressing     Problem: Respiratory - Adult  Goal: Achieves optimal ventilation and oxygenation  Outcome: Progressing     Problem: Musculoskeletal - Adult  Goal: Return mobility to safest level of function  Outcome: Progressing  Goal: Maintain proper alignment of affected body part  Outcome: Progressing  Goal: Return ADL status to a safe level of function  Outcome: Progressing     Problem: Infection - Adult  Goal: Absence of infection at discharge  Outcome: Progressing  Goal: Absence of infection during hospitalization  Outcome: Progressing  Goal: Absence of fever/infection during anticipated neutropenic period  Outcome: Progressing     Problem: ABCDS Injury Assessment  Goal: Absence of physical injury  Outcome: Progressing

## 2025-03-21 NOTE — CONSULTS
Dispense Refill    lisinopril (PRINIVIL;ZESTRIL) 10 MG tablet TAKE 1 TABLET BY MOUTH EVERY DAY 90 tablet 1    metoprolol (LOPRESSOR) 100 MG tablet TAKE 1 TABLET BY MOUTH TWICE A  tablet 1    amLODIPine (NORVASC) 10 MG tablet TAKE 1 TABLET BY MOUTH EVERY DAY 90 tablet 1    pravastatin (PRAVACHOL) 40 MG tablet Take 1 tablet by mouth every evening 90 tablet 1    indomethacin (INDOCIN) 25 MG capsule TAKE 2 CAPSULES BY MOUTH 2 TIMES DAILY (WITH MEALS) 60 capsule 1    allopurinol (ZYLOPRIM) 100 MG tablet Take 1 tablet by mouth daily 90 tablet 1        guaiFENesin-dextromethorphan  5 mL Oral Q8H    ipratropium 0.5 mg-albuterol 2.5 mg  1 Dose Inhalation BID RT    oseltamivir  75 mg Oral BID    amLODIPine  10 mg Oral Daily    pravastatin  40 mg Oral QPM    metoprolol  100 mg Oral BID    sodium chloride flush  5-40 mL IntraVENous 2 times per day    enoxaparin  40 mg SubCUTAneous Daily    lisinopril  10 mg Oral Daily    allopurinol  100 mg Oral Daily    insulin lispro  0-8 Units SubCUTAneous 4x Daily AC & HS    cefTRIAXone (ROCEPHIN) 1,000 mg in sterile water 10 mL IV syringe  1,000 mg IntraVENous Q24H      sodium chloride      dextrose       melatonin, benzonatate, HYDROcodone 5 mg - acetaminophen, sodium chloride flush, sodium chloride, potassium chloride **OR** potassium alternative oral replacement **OR** potassium chloride, magnesium sulfate, ondansetron **OR** ondansetron, polyethylene glycol, acetaminophen **OR** acetaminophen, albuterol, dextrose bolus **OR** dextrose bolus, glucagon (rDNA), dextrose      ALLERGIES:   Allergies as of 03/16/2025    (No Known Allergies)      OBJECTIVE:   height is 1.829 m (6') and weight is 99.9 kg (220 lb 3.8 oz). His oral temperature is 97.3 °F (36.3 °C). His blood pressure is 141/80 (abnormal) and his pulse is 66. His respiration is 16 and oxygen saturation is 91%.   I/O this shift:  In: 10 [I.V.:10]  Out: -      PHYSICAL EXAM:    CONSTITUTIONAL: He is a 70 y.o.-year-old who  (RT-PCR)-based in vitro  diagnostic test intended for the qualitative detection of nucleic  acids from SARS-CoV-2, influenza A, and influenza B in nasopharyngeal  and nasal swab specimens for use under the FDA’s Emergency Use  Authorization (EUA) only.    Patient Fact Sheet:  https://www.fda.gov/media/318585/download  Provider Fact Sheet: https://www.fda.gov/media/508113/download  EUA: https://www.fda.gov/media/205458/download  IFU: https://www.fda.gov/media/829921/download    Methodology:  RT-PCR          Influenza A DETECTED     Influenza B NOT DETECTED             IMPRESSION AND PLAN:      Acute respiratory distress  Acute pneumonitis due to influenza A infection  Acute hypoxic respiratory failure, resolving  Mild intermittent asthma in exacerbation    -Patient presented to the hospital with acute respiratory distress and found to have acute hypoxic respiratory failure.  -CT chest done at the time of admission was personally viewed by me and it showed bilateral patchy multifocal infiltrate which showed some central cavitation.  -Chest x-ray repeated today was personally viewed by me.  It shows improvement in bilateral aeration with resolution of multifocal infiltrate.  -In my opinion these were pneumonitis due to influenza A infection.  -With ongoing treatment with oseltamavir, symptoms are resolving.  -Low suspicion that he had bacterial infection.  Did have leukocytosis which is slowly trending down.  -Patient remains afebrile.  -Oxygen requirements have improved.  During the interview on room air he was saturating 93%.  -May need home O2 evaluation prior to discharge.  -I will recommend that he should be discharged on Symbicort 160/4.5 mcg 2 puff twice daily for 1 month.  -He should also be given albuterol HFA to use as needed.  -Finish a 5-day course of oseltamavir.  -Change antibiotic to p.o. Levaquin and finish a total of 5-day course to give him the benefit of doubt.  -No wheezing was heard on

## 2025-03-24 ENCOUNTER — CARE COORDINATION (OUTPATIENT)
Dept: CASE MANAGEMENT | Age: 70
End: 2025-03-24

## 2025-03-24 DIAGNOSIS — J15.9 COMMUNITY ACQUIRED BACTERIAL PNEUMONIA: Primary | ICD-10-CM

## 2025-03-24 PROCEDURE — 1111F DSCHRG MED/CURRENT MED MERGE: CPT | Performed by: INTERNAL MEDICINE

## 2025-03-24 NOTE — CARE COORDINATION
Care Transitions Note    Initial Call - Call within 2 business days of discharge: Yes    Patient Current Location:  Home: [address] [place] [address]    Care Transition Nurse contacted the patient by telephone to perform post hospital discharge assessment, verified name and  as identifiers.  Provided introduction to self, and explanation of the Care Transition Nurse role.    Patient: Duy Gant    Patient : 1955   MRN: [id number]    Reason for Admission:   Discharge Date: 3/21/25  RURS: Readmission Risk Score: 6.1      Last Discharge Facility       Date Complaint Diagnosis Description Type Department Provider    3/16/25 Shortness of Breath Pneumonia due to infectious organism, unspecified laterality, unspecified part of lung ... ED to Hosp-Admission (Discharged) (ADMITTED) [facility] Nehal Duran MD; Misti...            Was this an external facility discharge? No    Additional needs identified to be addressed with provider   No needs identified             Method of communication with provider: none.    Patients top risk factors for readmission: medical condition-CAP,flu    Interventions to address risk factors:   Review of patient management of conditions/medications: CAP,flu    Care Summary Note: Pt states doing well, no issues or concerns. Denies SOB, CP, cough, fever.Will send message to PCP office to schedule a hospital f/u visit. Agreed to more CTC f/u calls.      Care Transition Nurse reviewed discharge instructions with patient. The patient was given an opportunity to ask questions; all questions answered at this time.. The patient verbalized understanding.   Were discharge instructions available to patient? Yes.   Reviewed appropriate site of care based on symptoms and resources available to patient including: PCP  When to call 911. The patient agrees to contact the primary care provider and/or specialist office for questions related to their healthcare.      [facility]

## 2025-04-01 ENCOUNTER — CARE COORDINATION (OUTPATIENT)
Dept: CASE MANAGEMENT | Age: 70
End: 2025-04-01

## 2025-04-01 NOTE — CARE COORDINATION
Care Transitions Note    Follow Up Call     Patient Current Location:  Home: [address] [facility] [address] [facility] [id number]    Care Transition Nurse contacted the patient by telephone. Verified name and  as identifiers.    Additional needs identified to be addressed with provider   No needs identified                 Method of communication with provider: none.    Care Summary Note: Pt states doing well, no issues or concerns. Agreed to more CTC f/u calls.      Advance Care Planning:   Does patient have an Advance Directive: reviewed during previous call, see note. .    Medication Review:  No changes since last call.     Remote Patient Monitoring:  Offered patient enrollment in the Remote Patient Monitoring (RPM) program for in-home monitoring: future call.    Assessments:  Care Transitions Subsequent and Final Call    Subsequent and Final Calls  Do you have any ongoing symptoms?: No  Have your medications changed?: No  Do you have any questions related to your medications?: No  Do you currently have any active services?: No  Do you have any needs or concerns that I can assist you with?: No  Identified Barriers: None  Care Transitions Interventions  No Identified Needs  Other Interventions:              Follow Up Appointment:   Declined to schedule BR[facility] appointment. Looking for new PCP. Referral line # given.      Care Transition Nurse provided contact information.  Plan for follow-up call in 6-10 days based on severity of symptoms and risk factors.  Plan for next call: self management-       Parker Post RN

## 2025-04-08 ENCOUNTER — CARE COORDINATION (OUTPATIENT)
Dept: CASE MANAGEMENT | Age: 70
End: 2025-04-08

## 2025-04-08 NOTE — CARE COORDINATION
Care Transitions Note    Follow Up Call     Patient Current Location:  Home: [address] [facility] [address]    Care Transition Nurse contacted the patient by telephone. Verified name and  as identifiers.    Additional needs identified to be addressed with provider   No needs identified                 Method of communication with provider: none.    Care Summary Note: Pt states doing well, no issues or concerns. Agreed to more CTC f/u calls.      Advance Care Planning:   Does patient have an Advance Directive: reviewed during previous call, see note. .    Medication Review:  No changes since last call.     Remote Patient Monitoring:  Offered patient enrollment in the Remote Patient Monitoring (RPM) program for in-home monitoring: Yes, but did not enroll at this time: controlled chronic disease management.    Assessments:  Care Transitions Subsequent and Final Call    Subsequent and Final Calls  Do you have any ongoing symptoms?: No  Have your medications changed?: No  Do you have any questions related to your medications?: No  Do you currently have any active services?: No  Do you have any needs or concerns that I can assist you with?: No  Identified Barriers: None  Care Transitions Interventions  No Identified Needs  Other Interventions:              Follow Up Appointment:         Care Transition Nurse provided contact information.  Plan for follow-up call in 6-10 days based on severity of symptoms and risk factors.  Plan for next call: self management-       Parker Post RN

## 2025-04-09 ENCOUNTER — TELEPHONE (OUTPATIENT)
Dept: PRIMARY CARE CLINIC | Age: 70
End: 2025-04-09

## 2025-04-09 NOTE — TELEPHONE ENCOUNTER
I have contacted this patient by phone to schedule 6 month follow up and AWV, patient is currently without transportation and will call back to make arrangements

## 2025-04-15 ENCOUNTER — CARE COORDINATION (OUTPATIENT)
Dept: CASE MANAGEMENT | Age: 70
End: 2025-04-15

## 2025-04-23 ENCOUNTER — CARE COORDINATION (OUTPATIENT)
Dept: CASE MANAGEMENT | Age: 70
End: 2025-04-23

## 2025-04-23 NOTE — CARE COORDINATION
Care Transitions Note    Follow Up Call     Attempted to reach patient for transitions of care follow up.  Unable to reach patient.      Outreach Attempts:   Unable to leave message.     Care Summary Note:     Follow Up Appointment:       Plan for follow-up call in 2-5 days based on severity of symptoms and risk factors. Plan for next call: self management-     Parker Post RN

## 2025-04-25 ENCOUNTER — CARE COORDINATION (OUTPATIENT)
Dept: CASE MANAGEMENT | Age: 70
End: 2025-04-25

## 2025-04-25 NOTE — CARE COORDINATION
Care Transitions Note    Final Call     Patient Current Location:  Home: [id number] [facility] [address]    Care Transition Nurse contacted the patient by telephone. Verified name and  as identifiers.    Patient graduated from the Care Transitions program on 25.  Patient/family verbalizes confidence in the ability to self-manage at this time..      Advance Care Planning:   Does patient have an Advance Directive: reviewed during previous call, see note. .    Handoff:   Patient was not referred to the ACM team due to no additional needs identified.       Care Summary Note: Pt states doing well, no issues or concerns. No need for further f/u CTC calls.    Assessments:  Care Transitions Subsequent and Final Call    Subsequent and Final Calls  Do you have any ongoing symptoms?: No  Have your medications changed?: No  Do you have any questions related to your medications?: No  Do you currently have any active services?: No  Do you have any needs or concerns that I can assist you with?: No  Identified Barriers: None  Care Transitions Interventions  No Identified Needs  Other Interventions:              Upcoming Appointments:        Patient has agreed to contact primary care provider and/or specialist for any further questions, concerns, or needs.    Parker Post RN

## 2025-06-02 DIAGNOSIS — I10 ESSENTIAL (PRIMARY) HYPERTENSION: ICD-10-CM

## 2025-06-02 RX ORDER — AMLODIPINE BESYLATE 10 MG/1
10 TABLET ORAL DAILY
Qty: 90 TABLET | Refills: 1 | Status: SHIPPED | OUTPATIENT
Start: 2025-06-02

## 2025-06-02 RX ORDER — METOPROLOL TARTRATE 100 MG/1
100 TABLET ORAL 2 TIMES DAILY
Qty: 180 TABLET | Refills: 1 | Status: SHIPPED | OUTPATIENT
Start: 2025-06-02

## 2025-06-11 RX ORDER — PRAVASTATIN SODIUM 40 MG
40 TABLET ORAL EVERY EVENING
Qty: 90 TABLET | Refills: 1 | Status: SHIPPED | OUTPATIENT
Start: 2025-06-11

## 2025-07-16 ENCOUNTER — TELEPHONE (OUTPATIENT)
Dept: PRIMARY CARE CLINIC | Age: 70
End: 2025-07-16

## 2025-07-16 DIAGNOSIS — M10.9 GOUT, UNSPECIFIED CAUSE, UNSPECIFIED CHRONICITY, UNSPECIFIED SITE: Primary | ICD-10-CM

## 2025-07-16 RX ORDER — INDOMETHACIN 25 MG/1
25 CAPSULE ORAL 3 TIMES DAILY
Qty: 30 CAPSULE | Refills: 0 | Status: SHIPPED | OUTPATIENT
Start: 2025-07-16 | End: 2026-07-16

## 2025-07-17 ENCOUNTER — TELEPHONE (OUTPATIENT)
Dept: INTERNAL MEDICINE CLINIC | Age: 70
End: 2025-07-17

## 2025-07-17 ENCOUNTER — TELEPHONE (OUTPATIENT)
Dept: ADMINISTRATIVE | Age: 70
End: 2025-07-17

## 2025-07-17 NOTE — TELEPHONE ENCOUNTER
----- Message from FAITH GRUBER MA sent at 7/17/2025  9:53 AM EDT -----  Regarding: FW: [facility] Appointment Request    ----- Message -----  From: Jie Caba  Sent: 7/17/2025   9:20 AM EDT  To: [facility]  Subject: [facility] Appointment Request                          [facility] Appointment Request    Patient needs appointment for E[facility] Appointment Type: New to Provider.    Patient Requested Dates(s): Any Day  Patient Requested Time: Anytime  Provider Name: Cathy Child MD    Reason for Appointment Request: New Patient - No appointments available during search. Patient would like to make an appointment for Dr. Cathy Child MD for a new patient appointment. He would like her to be his PCP.    --------------------------------------------------------------------------------------------------------------------------    Relationship to Patient: Self     Call Back Information: OK to leave message on voicemail  Preferred Call Back Number: Phone [phone] (home)

## 2025-08-08 ENCOUNTER — PATIENT MESSAGE (OUTPATIENT)
Dept: PRIMARY CARE CLINIC | Age: 70
End: 2025-08-08

## 2025-08-25 RX ORDER — LISINOPRIL 10 MG/1
10 TABLET ORAL DAILY
Qty: 90 TABLET | Refills: 1 | Status: SHIPPED | OUTPATIENT
Start: 2025-08-25

## 2025-09-02 ENCOUNTER — TELEPHONE (OUTPATIENT)
Dept: PRIMARY CARE CLINIC | Age: 70
End: 2025-09-02

## 2025-09-02 DIAGNOSIS — M10.9 GOUT, UNSPECIFIED CAUSE, UNSPECIFIED CHRONICITY, UNSPECIFIED SITE: ICD-10-CM

## 2025-09-02 RX ORDER — INDOMETHACIN 25 MG/1
25 CAPSULE ORAL 3 TIMES DAILY
Qty: 30 CAPSULE | Refills: 1 | Status: SHIPPED | OUTPATIENT
Start: 2025-09-02 | End: 2026-09-02